# Patient Record
Sex: MALE | Race: BLACK OR AFRICAN AMERICAN | NOT HISPANIC OR LATINO | Employment: OTHER | RURAL
[De-identification: names, ages, dates, MRNs, and addresses within clinical notes are randomized per-mention and may not be internally consistent; named-entity substitution may affect disease eponyms.]

---

## 2021-08-24 ENCOUNTER — OFFICE VISIT (OUTPATIENT)
Dept: FAMILY MEDICINE CLINIC | Facility: CLINIC | Age: 66
End: 2021-08-24

## 2021-08-24 ENCOUNTER — LAB (OUTPATIENT)
Dept: LAB | Facility: HOSPITAL | Age: 66
End: 2021-08-24

## 2021-08-24 VITALS
DIASTOLIC BLOOD PRESSURE: 90 MMHG | SYSTOLIC BLOOD PRESSURE: 170 MMHG | HEIGHT: 68 IN | OXYGEN SATURATION: 95 % | TEMPERATURE: 97.5 F | WEIGHT: 298.3 LBS | BODY MASS INDEX: 45.21 KG/M2 | HEART RATE: 102 BPM

## 2021-08-24 DIAGNOSIS — M54.2 NECK PAIN ON LEFT SIDE: ICD-10-CM

## 2021-08-24 DIAGNOSIS — Z79.4 TYPE 2 DIABETES MELLITUS WITH HYPERGLYCEMIA, WITH LONG-TERM CURRENT USE OF INSULIN (HCC): ICD-10-CM

## 2021-08-24 DIAGNOSIS — R13.10 DYSPHAGIA, UNSPECIFIED TYPE: ICD-10-CM

## 2021-08-24 DIAGNOSIS — Z13.220 LIPID SCREENING: ICD-10-CM

## 2021-08-24 DIAGNOSIS — Z11.59 NEED FOR HEPATITIS C SCREENING TEST: ICD-10-CM

## 2021-08-24 DIAGNOSIS — E11.65 TYPE 2 DIABETES MELLITUS WITH HYPERGLYCEMIA, WITH LONG-TERM CURRENT USE OF INSULIN (HCC): ICD-10-CM

## 2021-08-24 DIAGNOSIS — I10 HTN (HYPERTENSION), BENIGN: ICD-10-CM

## 2021-08-24 LAB
ALBUMIN SERPL-MCNC: 4.3 G/DL (ref 3.5–5.2)
ALBUMIN UR-MCNC: 65.2 MG/DL
ALBUMIN/GLOB SERPL: 1.1 G/DL
ALP SERPL-CCNC: 63 U/L (ref 39–117)
ALT SERPL W P-5'-P-CCNC: 22 U/L (ref 1–41)
ANION GAP SERPL CALCULATED.3IONS-SCNC: 11 MMOL/L (ref 5–15)
AST SERPL-CCNC: 22 U/L (ref 1–40)
BASOPHILS # BLD AUTO: 0.05 10*3/MM3 (ref 0–0.2)
BASOPHILS NFR BLD AUTO: 0.6 % (ref 0–1.5)
BILIRUB SERPL-MCNC: 0.6 MG/DL (ref 0–1.2)
BUN SERPL-MCNC: 16 MG/DL (ref 8–23)
BUN/CREAT SERPL: 12.3 (ref 7–25)
CALCIUM SPEC-SCNC: 9.7 MG/DL (ref 8.6–10.5)
CHLORIDE SERPL-SCNC: 96 MMOL/L (ref 98–107)
CHOLEST SERPL-MCNC: 210 MG/DL (ref 0–200)
CO2 SERPL-SCNC: 35 MMOL/L (ref 22–29)
CREAT SERPL-MCNC: 1.3 MG/DL (ref 0.76–1.27)
DEPRECATED RDW RBC AUTO: 41.6 FL (ref 37–54)
EOSINOPHIL # BLD AUTO: 0.08 10*3/MM3 (ref 0–0.4)
EOSINOPHIL NFR BLD AUTO: 1 % (ref 0.3–6.2)
ERYTHROCYTE [DISTWIDTH] IN BLOOD BY AUTOMATED COUNT: 16.8 % (ref 12.3–15.4)
EXPIRATION DATE: ABNORMAL
GFR SERPL CREATININE-BSD FRML MDRD: 67 ML/MIN/1.73
GLOBULIN UR ELPH-MCNC: 3.9 GM/DL
GLUCOSE SERPL-MCNC: 159 MG/DL (ref 65–99)
HBA1C MFR BLD: 9.9 %
HCT VFR BLD AUTO: 52 % (ref 37.5–51)
HDLC SERPL-MCNC: 41 MG/DL (ref 40–60)
HGB BLD-MCNC: 16.6 G/DL (ref 13–17.7)
IMM GRANULOCYTES # BLD AUTO: 0.02 10*3/MM3 (ref 0–0.05)
IMM GRANULOCYTES NFR BLD AUTO: 0.2 % (ref 0–0.5)
LDLC SERPL CALC-MCNC: 136 MG/DL (ref 0–100)
LDLC/HDLC SERPL: 3.24 {RATIO}
LYMPHOCYTES # BLD AUTO: 2.73 10*3/MM3 (ref 0.7–3.1)
LYMPHOCYTES NFR BLD AUTO: 33.5 % (ref 19.6–45.3)
Lab: ABNORMAL
MCH RBC QN AUTO: 25.3 PG (ref 26.6–33)
MCHC RBC AUTO-ENTMCNC: 31.9 G/DL (ref 31.5–35.7)
MCV RBC AUTO: 79.3 FL (ref 79–97)
MONOCYTES # BLD AUTO: 0.72 10*3/MM3 (ref 0.1–0.9)
MONOCYTES NFR BLD AUTO: 8.8 % (ref 5–12)
NEUTROPHILS NFR BLD AUTO: 4.55 10*3/MM3 (ref 1.7–7)
NEUTROPHILS NFR BLD AUTO: 55.9 % (ref 42.7–76)
NRBC BLD AUTO-RTO: 0.1 /100 WBC (ref 0–0.2)
PLATELET # BLD AUTO: 297 10*3/MM3 (ref 140–450)
PMV BLD AUTO: 10.8 FL (ref 6–12)
POTASSIUM SERPL-SCNC: 2.9 MMOL/L (ref 3.5–5.2)
PROT SERPL-MCNC: 8.2 G/DL (ref 6–8.5)
RBC # BLD AUTO: 6.56 10*6/MM3 (ref 4.14–5.8)
SODIUM SERPL-SCNC: 142 MMOL/L (ref 136–145)
TRIGL SERPL-MCNC: 181 MG/DL (ref 0–150)
TSH SERPL DL<=0.05 MIU/L-ACNC: 0.92 UIU/ML (ref 0.27–4.2)
VLDLC SERPL-MCNC: 33 MG/DL (ref 5–40)
WBC # BLD AUTO: 8.15 10*3/MM3 (ref 3.4–10.8)

## 2021-08-24 PROCEDURE — 83036 HEMOGLOBIN GLYCOSYLATED A1C: CPT | Performed by: STUDENT IN AN ORGANIZED HEALTH CARE EDUCATION/TRAINING PROGRAM

## 2021-08-24 PROCEDURE — 82043 UR ALBUMIN QUANTITATIVE: CPT | Performed by: STUDENT IN AN ORGANIZED HEALTH CARE EDUCATION/TRAINING PROGRAM

## 2021-08-24 PROCEDURE — 80053 COMPREHEN METABOLIC PANEL: CPT | Performed by: STUDENT IN AN ORGANIZED HEALTH CARE EDUCATION/TRAINING PROGRAM

## 2021-08-24 PROCEDURE — 80061 LIPID PANEL: CPT | Performed by: STUDENT IN AN ORGANIZED HEALTH CARE EDUCATION/TRAINING PROGRAM

## 2021-08-24 PROCEDURE — 99204 OFFICE O/P NEW MOD 45 MIN: CPT | Performed by: STUDENT IN AN ORGANIZED HEALTH CARE EDUCATION/TRAINING PROGRAM

## 2021-08-24 PROCEDURE — 84443 ASSAY THYROID STIM HORMONE: CPT | Performed by: STUDENT IN AN ORGANIZED HEALTH CARE EDUCATION/TRAINING PROGRAM

## 2021-08-24 PROCEDURE — 86803 HEPATITIS C AB TEST: CPT | Performed by: STUDENT IN AN ORGANIZED HEALTH CARE EDUCATION/TRAINING PROGRAM

## 2021-08-24 PROCEDURE — 85025 COMPLETE CBC W/AUTO DIFF WBC: CPT | Performed by: STUDENT IN AN ORGANIZED HEALTH CARE EDUCATION/TRAINING PROGRAM

## 2021-08-24 RX ORDER — CARVEDILOL 25 MG/1
25 TABLET ORAL 2 TIMES DAILY WITH MEALS
COMMUNITY
End: 2021-12-07 | Stop reason: SDUPTHER

## 2021-08-24 RX ORDER — POTASSIUM CHLORIDE 1500 MG/1
20 TABLET, FILM COATED, EXTENDED RELEASE ORAL DAILY
COMMUNITY
End: 2021-09-17 | Stop reason: SDUPTHER

## 2021-08-24 RX ORDER — MELATONIN
1250 DAILY
COMMUNITY
End: 2021-12-27 | Stop reason: SDUPTHER

## 2021-08-24 RX ORDER — HYDRALAZINE HYDROCHLORIDE 50 MG/1
50 TABLET, FILM COATED ORAL 3 TIMES DAILY
COMMUNITY
End: 2021-12-07 | Stop reason: SDUPTHER

## 2021-08-24 RX ORDER — CHLORTHALIDONE 50 MG/1
50 TABLET ORAL DAILY
COMMUNITY
End: 2021-12-27 | Stop reason: SDUPTHER

## 2021-08-24 RX ORDER — NIFEDIPINE 60 MG/1
60 TABLET, EXTENDED RELEASE ORAL 2 TIMES DAILY
COMMUNITY
End: 2021-12-27 | Stop reason: SDUPTHER

## 2021-08-24 RX ORDER — SPIRONOLACTONE 100 MG/1
100 TABLET, FILM COATED ORAL DAILY
COMMUNITY
End: 2021-12-07 | Stop reason: SDUPTHER

## 2021-08-24 RX ORDER — CHLORAL HYDRATE 500 MG
1000 CAPSULE ORAL 2 TIMES DAILY WITH MEALS
COMMUNITY
End: 2021-12-27 | Stop reason: SDUPTHER

## 2021-08-24 RX ORDER — GABAPENTIN 800 MG/1
800 TABLET ORAL 3 TIMES DAILY
COMMUNITY
End: 2021-10-26 | Stop reason: SDUPTHER

## 2021-08-24 RX ORDER — MELOXICAM 15 MG/1
15 TABLET ORAL DAILY
COMMUNITY
End: 2021-12-07

## 2021-08-24 RX ORDER — OMEPRAZOLE 20 MG/1
20 CAPSULE, DELAYED RELEASE ORAL 2 TIMES DAILY
COMMUNITY
End: 2021-09-09 | Stop reason: DRUGHIGH

## 2021-08-24 RX ORDER — BLOOD-GLUCOSE METER
KIT MISCELLANEOUS
Qty: 1 EACH | Refills: 0 | Status: SHIPPED | OUTPATIENT
Start: 2021-08-24

## 2021-08-24 RX ORDER — CHOLECALCIFEROL (VITAMIN D3) 125 MCG
500 CAPSULE ORAL DAILY
COMMUNITY
End: 2021-12-27 | Stop reason: SDUPTHER

## 2021-08-24 RX ORDER — INSULIN GLARGINE 100 [IU]/ML
60 INJECTION, SOLUTION SUBCUTANEOUS
COMMUNITY
End: 2021-12-27 | Stop reason: SDUPTHER

## 2021-08-24 RX ORDER — LANCETS 28 GAUGE
EACH MISCELLANEOUS
Qty: 100 EACH | Refills: 5 | Status: SHIPPED | OUTPATIENT
Start: 2021-08-24 | End: 2022-01-05 | Stop reason: SDUPTHER

## 2021-08-24 RX ORDER — ROSUVASTATIN CALCIUM 40 MG/1
20 TABLET, COATED ORAL 3 TIMES WEEKLY
COMMUNITY
End: 2021-12-07 | Stop reason: SDUPTHER

## 2021-08-24 NOTE — PROGRESS NOTES
"Subjective:  Azael Bray is a 65 y.o. male who presents for establish care.    Previous PCP of 20 years retired, has not seen a MD in ~ 6 months.     Hypertension; patient currently on chlorthalidone 50 mg daily, carvedilol 25 mg, Hydralazine 50mg TID, Nifedipine 60mg XL. Does not remember if he has been on lisinopril or losartan. Blood pressure at home is ~150's/80's. No headaches, vision changes, numbness, tingling, weakness. States has not been taking his medication regularly because of dysphagia. Reports a hyperactive adrenal gland, reportedly had urine testing and CT scan with contrast in 2012 and was told that it did not have to be removed. Has sleep apnea but not compliant with CPAP.     DM2; States is on Lantus 62u every morning, Trulicity 1.5mg weekly. States was on Metformin but was unable to tolerate. Believes previous a1c was 7.2%, A1c was 9.9%. Up to date on eye exam and foot exam.        Dysphagia; states that has left sided neck discomfort and dysphagia. States had a EGD which was reportedly negative. Does not cough after drinking water or foods.  States approximately 4 months ago was 330 pounds, is down to 300 pounds, weight loss intentional.  No night sweats, fever, chills, lymphadenopathy.    Vitals:    Vitals:    08/24/21 0829   BP: 170/90   BP Location: Right arm   Patient Position: Sitting   Cuff Size: Large Adult   Pulse: 102   Temp: 97.5 °F (36.4 °C)   SpO2: 95%   Weight: 135 kg (298 lb 4.8 oz)   Height: 172.7 cm (68\")     Body mass index is 45.36 kg/m².    Current Outpatient Medications:   •  carvedilol (COREG) 25 MG tablet, Take 25 mg by mouth 2 (Two) Times a Day With Meals., Disp: , Rfl:   •  chlorthalidone (HYGROTEN) 50 MG tablet, Take 50 mg by mouth Daily., Disp: , Rfl:   •  cholecalciferol (VITAMIN D3) 25 MCG (1000 UT) tablet, Take 1,250 Units by mouth Daily., Disp: , Rfl:   •  gabapentin (NEURONTIN) 800 MG tablet, Take 800 mg by mouth 3 (Three) Times a Day., Disp: , Rfl:   •  " hydrALAZINE (APRESOLINE) 50 MG tablet, Take 50 mg by mouth 3 (Three) Times a Day., Disp: , Rfl:   •  insulin glargine (Lantus) 100 UNIT/ML injection, Inject 60 Units under the skin into the appropriate area as directed Every Morning Before Breakfast., Disp: , Rfl:   •  Insulin Syringes, Disposable, U-100 0.3 ML misc, 1 each Daily., Disp: , Rfl:   •  meloxicam (MOBIC) 15 MG tablet, Take 15 mg by mouth Daily., Disp: , Rfl:   •  NIFEdipine XL (PROCARDIA XL) 60 MG 24 hr tablet, Take 60 mg by mouth 2 (two) times a day., Disp: , Rfl:   •  Omega-3 Fatty Acids (fish oil) 1000 MG capsule capsule, Take 1,000 mg by mouth 2 (Two) Times a Day With Meals., Disp: , Rfl:   •  omeprazole (priLOSEC) 20 MG capsule, Take 20 mg by mouth 2 (two) times a day., Disp: , Rfl:   •  potassium chloride ER (K-TAB) 20 MEQ tablet controlled-release ER tablet, Take 20 mEq by mouth Daily., Disp: , Rfl:   •  rosuvastatin (CRESTOR) 40 MG tablet, Take 20 mg by mouth 3 (Three) Times a Week., Disp: , Rfl:   •  spironolactone (ALDACTONE) 100 MG tablet, Take 50 mg by mouth Daily., Disp: , Rfl:   •  vitamin B-12 (CYANOCOBALAMIN) 500 MCG tablet, Take 500 mcg by mouth Daily., Disp: , Rfl:   •  Dulaglutide 3 MG/0.5ML solution pen-injector, Inject 3 mg under the skin into the appropriate area as directed 1 (One) Time Per Week., Disp: 4 pen, Rfl: 3  •  glucose blood test strip, Use daily and as need up to TID, Disp: 100 each, Rfl: 5  •  glucose monitor monitoring kit, Use daily and as need up to TID, Disp: 1 each, Rfl: 0  •  Lancets (freestyle) lancets, Use daily and as need up to TID, Disp: 100 each, Rfl: 5    There is no problem list on file for this patient.    Past Surgical History:   Procedure Laterality Date   • APPENDECTOMY     • HERNIA REPAIR Left      Social History     Socioeconomic History   • Marital status:      Spouse name: Not on file   • Number of children: Not on file   • Years of education: Not on file   • Highest education level: Not on  file   Tobacco Use   • Smoking status: Former Smoker   • Smokeless tobacco: Never Used   Vaping Use   • Vaping Use: Never used   Substance and Sexual Activity   • Alcohol use: Not Currently   • Drug use: Never   • Sexual activity: Not Currently     Family History   Problem Relation Age of Onset   • Diabetes Father    • Hypertension Father    • Heart disease Father    • Diabetes Paternal Grandfather      No visits with results within 6 Month(s) from this visit.   Latest known visit with results is:   No results found for any previous visit.      No image results found.      [unfilled]  Immunization History   Administered Date(s) Administered   • COVID-19 (MODERNA) 02/24/2021, 03/24/2021     The following portions of the patient's history were reviewed and updated as appropriate: allergies, current medications, past family history, past medical history, past social history, past surgical history and problem list.    PHQ-9 Total Score: 13       Lab 08/24/21  0914   HEMOGLOBIN A1C 9.9*       Physical Exam  Constitutional:       General: He is not in acute distress.  HENT:      Head: Normocephalic and atraumatic.      Right Ear: Tympanic membrane and ear canal normal.      Left Ear: Tympanic membrane and ear canal normal.      Mouth/Throat:      Mouth: Mucous membranes are moist.      Pharynx: Oropharynx is clear. No posterior oropharyngeal erythema.   Eyes:      Extraocular Movements: Extraocular movements intact.      Pupils: Pupils are equal, round, and reactive to light.   Neck:     Cardiovascular:      Rate and Rhythm: Normal rate and regular rhythm.      Heart sounds: Normal heart sounds. No murmur heard.     Pulmonary:      Effort: Pulmonary effort is normal.      Breath sounds: Normal breath sounds.   Abdominal:      General: Bowel sounds are normal.      Palpations: Abdomen is soft.      Tenderness: There is no abdominal tenderness.   Musculoskeletal:         General: No swelling.      Right lower leg: No  edema.      Left lower leg: No edema.   Skin:     Coloration: Skin is not jaundiced.      Findings: No rash.   Neurological:      Mental Status: He is alert and oriented to person, place, and time. Mental status is at baseline.   Psychiatric:         Mood and Affect: Mood normal.         Behavior: Behavior normal.       Assessment/Plan    Diagnosis Plan   1. Type 2 diabetes mellitus with hyperglycemia, with long-term current use of insulin (CMS/[facility])  POC Glycated Hemoglobin, Total    Lancets (freestyle) lancets    glucose monitor monitoring kit    glucose blood test strip    CBC & Differential    Comprehensive Metabolic Panel    Dulaglutide 3 MG/0.5ML solution pen-injector    MicroAlbumin, Urine, Random - Urine, Clean Catch   2. Neck pain on left side  US Thyroid    TSH Rfx On Abnormal To Free T4   3. Dysphagia, unspecified type  Ambulatory Referral to Gastroenterology    TSH Rfx On Abnormal To Free T4   4. HTN (hypertension), benign     5. Lipid screening  Lipid Panel   6. Need for hepatitis C screening test  HCV Antibody Rfx To Qnt PCR      Orders Placed This Encounter   Procedures   • US Thyroid     Order Specific Question:   Reason for Exam:     Answer:   left sided neck pain with swallowing.     Order Specific Question:   Release to patient     Answer:   Immediate   • Comprehensive Metabolic Panel     Order Specific Question:   Release to patient     Answer:   Immediate   • Lipid Panel   • HCV Antibody Rfx To Qnt PCR     Order Specific Question:   Release to patient     Answer:   Immediate   • TSH Rfx On Abnormal To Free T4     Order Specific Question:   Release to patient     Answer:   Immediate   • MicroAlbumin, Urine, Random - Urine, Clean Catch     Order Specific Question:   Release to patient     Answer:   Immediate   • Ambulatory Referral to Gastroenterology     Referral Priority:   Routine     Referral Type:   Consultation     Referral Reason:   Specialty Services Required     Requested Specialty:    Gastroenterology     Number of Visits Requested:   1   • POC Glycated Hemoglobin, Total     Order Specific Question:   Release to patient     Answer:   Immediate   • CBC & Differential     Order Specific Question:   Manual Differential     Answer:   No     Hypertension; uncontrolled, likely due to medication nonadherence, CPAP nonadherence, adrenal tumor?  Reports adrenal tumor work-up in 2012 - for pheochromocytoma, need for intervention, denied palpitations, tachycardia, sweating, headaches, vision changes.  Reassuring.  Will obtain prior records, advised on importance of medication adherence, CPAP adherence, patient voiced understanding.  Will bring blood pressure log to follow-up in 3 weeks.    Dysphagia/neck pain; patient reports neck pain located over left side with swallowing, as such, has not been taking medications.  Physical exam reassuring, will obtain ultrasound and refer to gastroenterology for dysphagia work-up.  Denied history of prior surgeries.    Diabetes; uncontrolled, will increase Trulicity to 3 mg weekly, to facilitate better blood glucose control, weight loss.  Patient agreeable to plan.  Follow-up in 3 weeks with blood glucose log.  Up-to-date on eye, foot exam.  Labs as above, urine microalbumin as above, consider lisinopril/losartan.            This document has been electronically signed by Rafat Lin MD on August 24, 2021 09:44 CDT

## 2021-08-25 LAB
HCV AB S/CO SERPL IA: 0.4 S/CO RATIO (ref 0–0.9)
HCV AB SERPL QL IA: NORMAL

## 2021-08-25 NOTE — PROGRESS NOTES
Negative for hepatitis C, thyroid disease, liver disease.  Labs significant for hypokalemia, as such, please double your current potassium supplementation.  Additionally, cholesterol is uncontrolled, may need to titrate your cholesterol medication in future.  No sign of infection or anemia.

## 2021-08-26 ENCOUNTER — OFFICE VISIT (OUTPATIENT)
Dept: GASTROENTEROLOGY | Facility: CLINIC | Age: 66
End: 2021-08-26

## 2021-08-26 VITALS
HEART RATE: 82 BPM | SYSTOLIC BLOOD PRESSURE: 182 MMHG | DIASTOLIC BLOOD PRESSURE: 91 MMHG | WEIGHT: 296 LBS | BODY MASS INDEX: 44.86 KG/M2 | HEIGHT: 68 IN

## 2021-08-26 DIAGNOSIS — R13.10 DYSPHAGIA, UNSPECIFIED TYPE: Primary | ICD-10-CM

## 2021-08-26 DIAGNOSIS — R11.0 NAUSEA: ICD-10-CM

## 2021-08-26 DIAGNOSIS — K21.00 GASTROESOPHAGEAL REFLUX DISEASE WITH ESOPHAGITIS WITHOUT HEMORRHAGE: ICD-10-CM

## 2021-08-26 PROCEDURE — 99203 OFFICE O/P NEW LOW 30 MIN: CPT | Performed by: PHYSICIAN ASSISTANT

## 2021-08-27 ENCOUNTER — TELEPHONE (OUTPATIENT)
Dept: FAMILY MEDICINE CLINIC | Facility: CLINIC | Age: 66
End: 2021-08-27

## 2021-09-02 NOTE — TELEPHONE ENCOUNTER
Trulicity PA was denied because it was sent through Medicare part A and B. Called [facility] pharmacy to see if this needed a PA through his part D insurance. I was told that this still will need a PA. Requested Part D info.    [facility]  [id number]  Bin  [id number]  ID [id number]    Resubmitted to plan

## 2021-09-03 ENCOUNTER — PATIENT ROUNDING (BHMG ONLY) (OUTPATIENT)
Dept: FAMILY MEDICINE CLINIC | Facility: CLINIC | Age: 66
End: 2021-09-03

## 2021-09-03 NOTE — TELEPHONE ENCOUNTER
This was denied again stating that it cannot be billed under part B. I made sure to put it in for part D. I called [facility] and asked to speak with someone about doing this PA over the phone so I could make sure it is being sent through part D. Unfortunatley, when I called, they said that their system was down and that I would have to call back later. I was given the number [phone] for the PA line for [employer] part D.

## 2021-09-03 NOTE — PROGRESS NOTES
September 3, 2021        My name is Steven Faith LPN     I am  with [facility] - [address] [place] [address] [phone].      I am calling to officially welcome you to our practice and ask about your recent visit. Is this a good time to talk? No-Voicemail left      Thank you, and have a great day.

## 2021-09-09 ENCOUNTER — OFFICE VISIT (OUTPATIENT)
Dept: GASTROENTEROLOGY | Facility: CLINIC | Age: 66
End: 2021-09-09

## 2021-09-09 VITALS
DIASTOLIC BLOOD PRESSURE: 73 MMHG | WEIGHT: 302.2 LBS | SYSTOLIC BLOOD PRESSURE: 156 MMHG | HEIGHT: 68 IN | BODY MASS INDEX: 45.8 KG/M2 | HEART RATE: 86 BPM

## 2021-09-09 DIAGNOSIS — K21.00 GASTROESOPHAGEAL REFLUX DISEASE WITH ESOPHAGITIS WITHOUT HEMORRHAGE: ICD-10-CM

## 2021-09-09 DIAGNOSIS — R13.10 DYSPHAGIA, UNSPECIFIED TYPE: Primary | ICD-10-CM

## 2021-09-09 DIAGNOSIS — R11.0 NAUSEA: ICD-10-CM

## 2021-09-09 PROCEDURE — 99213 OFFICE O/P EST LOW 20 MIN: CPT | Performed by: PHYSICIAN ASSISTANT

## 2021-09-09 RX ORDER — OMEPRAZOLE 40 MG/1
40 CAPSULE, DELAYED RELEASE ORAL 2 TIMES DAILY
Qty: 60 CAPSULE | Refills: 2 | Status: SHIPPED | OUTPATIENT
Start: 2021-09-09 | End: 2021-12-07 | Stop reason: SDUPTHER

## 2021-09-09 NOTE — PATIENT INSTRUCTIONS
MyPlate from USDA    MyPlate is an outline of a general healthy diet based on the 2010 Dietary Guidelines for Americans, from the U.S. Department of Agriculture (USDA). It sets guidelines for how much food you should eat from each food group based on your age, sex, and level of physical activity.  What are tips for following MyPlate?  To follow MyPlate recommendations:  · Eat a wide variety of fruits and vegetables, grains, and protein foods.  · Serve smaller portions and eat less food throughout the day.  · Limit portion sizes to avoid overeating.  · Enjoy your food.  · Get at least 150 minutes of exercise every week. This is about 30 minutes each day, 5 or more days per week.  It can be difficult to have every meal look like MyPlate. Think about MyPlate as eating guidelines for an entire day, rather than each individual meal.  Fruits and vegetables  · Make half of your plate fruits and vegetables.  · Eat many different colors of fruits and vegetables each day.  · For a 2,000 calorie daily food plan, eat:  ? 2½ cups of vegetables every day.  ? 2 cups of fruit every day.  · 1 cup is equal to:  ? 1 cup raw or cooked vegetables.  ? 1 cup raw fruit.  ? 1 medium-sized orange, apple, or banana.  ? 1 cup 100% fruit or vegetable juice.  ? 2 cups raw leafy greens, such as lettuce, spinach, or kale.  ? ½ cup dried fruit.  Grains  · One fourth of your plate should be grains.  · Make at least half of the grains you eat each day whole grains.  · For a 2,000 calorie daily food plan, eat 6 oz of grains every day.  · 1 oz is equal to:  ? 1 slice bread.  ? 1 cup cereal.  ? ½ cup cooked rice, cereal, or pasta.  Protein  · One fourth of your plate should be protein.  · Eat a wide variety of protein foods, including meat, poultry, fish, eggs, beans, nuts, and tofu.  · For a 2,000 calorie daily food plan, eat 5½ oz of protein every day.  · 1 oz is equal to:  ? 1 oz meat, poultry, or fish.  ? ¼ cup cooked beans.  ? 1 egg.  ? ½ oz nuts  or seeds.  ? 1 Tbsp peanut butter.  Dairy  · Drink fat-free or low-fat (1%) milk.  · Eat or drink dairy as a side to meals.  · For a 2,000 calorie daily food plan, eat or drink 3 cups of dairy every day.  · 1 cup is equal to:  ? 1 cup milk, yogurt, cottage cheese, or soy milk (soy beverage).  ? 2 oz processed cheese.  ? 1½ oz natural cheese.  Fats, oils, salt, and sugars  · Only small amounts of oils are recommended.  · Avoid foods that are high in calories and low in nutritional value (empty calories), like foods high in fat or added sugars.  · Choose foods that are low in salt (sodium). Choose foods that have less than 140 milligrams (mg) of sodium per serving.  · Drink water instead of sugary drinks. Drink enough water each day to keep your urine pale yellow.  Where to find support  · Work with your health care provider or a nutrition specialist (dietitian) to develop a customized eating plan that is right for you.  · Download an yuniel (mobile application) to help you track your daily food intake.  Where to find more information  · Go to ChooseMyPlate.gov for more information.  Summary  · MyPlate is a general guideline for healthy eating from the USDA. It is based on the 2010 Dietary Guidelines for Americans.  · In general, fruits and vegetables should take up ½ of your plate, grains should take up ¼ of your plate, and protein should take up ¼ of your plate.  This information is not intended to replace advice given to you by your health care provider. Make sure you discuss any questions you have with your health care provider.  Document Revised: 05/21/2020 Document Reviewed: 03/19/2018  Elsevier Patient Education © 2021 Elsevier Inc.  BMI for Adults  What is BMI?  Body mass index (BMI) is a number that is calculated from a person's weight and height. BMI can help estimate how much of a person's weight is composed of fat. BMI does not measure body fat directly. Rather, it is an alternative to procedures that  "directly measure body fat, which can be difficult and expensive.  BMI can help identify people who may be at higher risk for certain medical problems.  What are BMI measurements used for?  BMI is used as a screening tool to identify possible weight problems. It helps determine whether a person is obese, overweight, a healthy weight, or underweight.  BMI is useful for:  · Identifying a weight problem that may be related to a medical condition or may increase the risk for medical problems.  · Promoting changes, such as changes in diet and exercise, to help reach a healthy weight. BMI screening can be repeated to see if these changes are working.  How is BMI calculated?  BMI involves measuring your weight in relation to your height. Both height and weight are measured, and the BMI is calculated from those numbers. This can be done either in English (U[place] or metric measurements. Note that charts and online BMI calculators are available to help you find your BMI quickly and easily without having to do these calculations yourself.  To calculate your BMI in English (U.S[place] measurements:    1. Measure your weight in pounds (lb).  2. Multiply the number of pounds by 703.  ? For example, for a person who weighs 180 lb, multiply that number by 703, which equals 126,540.  3. Measure your height in inches. Then multiply that number by itself to get a measurement called \"inches squared.\"  ? For example, for a person who is 70 inches tall, the \"inches squared\" measurement is 70 inches x 70 inches, which equals 4,900 inches squared.  4. Divide the total from step 2 (number of lb x 703) by the total from step 3 (inches squared): 126,540 ÷ 4,900 = 25.8. This is your BMI.    To calculate your BMI in metric measurements:  1. Measure your weight in kilograms (kg).  2. Measure your height in meters (m). Then multiply that number by itself to get a measurement called \"meters squared.\"  ? For example, for a person who is 1.75 m tall, the " "\"meters squared\" measurement is 1.75 m x 1.75 m, which is equal to 3.1 meters squared.  3. Divide the number of kilograms (your weight) by the meters squared number. In this example: 70 ÷ 3.1 = 22.6. This is your BMI.  What do the results mean?  BMI charts are used to identify whether you are underweight, normal weight, overweight, or obese. The following guidelines will be used:  · Underweight: BMI less than 18.5.  · Normal weight: BMI between 18.5 and 24.9.  · Overweight: BMI between 25 and 29.9.  · Obese: BMI of 30 or above.  Keep these notes in mind:  · Weight includes both fat and muscle, so someone with a muscular build, such as an athlete, may have a BMI that is higher than 24.9. In cases like these, BMI is not an accurate measure of body fat.  · To determine if excess body fat is the cause of a BMI of 25 or higher, further assessments may need to be done by a health care provider.  · BMI is usually interpreted in the same way for men and women.  Where to find more information  For more information about BMI, including tools to quickly calculate your BMI, go to these websites:  · Centers for Disease Control and Prevention: www.cdc.gov  · American Heart Association: www.heart.org  · National Heart, Lung, and Blood [place]: www.nhlbi.nih.gov  Summary  · Body mass index (BMI) is a number that is calculated from a person's weight and height.  · BMI may help estimate how much of a person's weight is composed of fat. BMI can help identify those who may be at higher risk for certain medical problems.  · BMI can be measured using English measurements or metric measurements.  · BMI charts are used to identify whether you are underweight, normal weight, overweight, or obese.  This information is not intended to replace advice given to you by your health care provider. Make sure you discuss any questions you have with your health care provider.  Document Revised: 09/09/2020 Document Reviewed: 07/17/2020  Sudheer " Patient Education © 2021 Elsevier Inc.

## 2021-09-09 NOTE — PROGRESS NOTES
Chief Complaint   Patient presents with   • Difficulty Swallowing   • Nausea       ENDO PROCEDURE ORDERED:    Subjective    Azael Bray is a 65 y.o. male. he is here today for follow-up.    History of Present Illness    The patient is seen on recheck of his nausea, GERD, dysphagia, and globus.  Last seen on 08/26/2021.  The patient had an esophagram at [facility] on 09/07/2021 and showed normal cervical esophagus and small hiatal hernia.  [facility]  He did not bring copies of the images of his esophagram as I requested, nor did he bring copies of any of his previous workup.  Again, he is still describing a sensation of things kind of hanging in the left of his neck.  He is on Prilosec 20 mg BID.  Bowels are moving without blood or mucus.  Weight is up 6 pounds since the last visit.  He believes he [facility] [place].      ASSESSMENT AND PLAN:  I had initially discussed possibly doing esophageal motility with the patient.  He described having had that before.  He has also apparently been worked up with ENT.  Unfortunately, again, I told him I have none of these records from his previous workup and have asked to try to get those.  If he is able, I have asked him to bring a copy of those records.  We will try increasing his Prilosec to 40 mg BID.  He states he is scheduled to have a thyroid ultrasound.  We will plan follow-up in 1 month.  Further will depend on clinical course and results of the above.       The following portions of the patient's history were reviewed and updated as appropriate:   Past Medical History:   Diagnosis Date   • Asthma    • Carpal tunnel syndrome    • CHF (congestive heart failure) (CMS/HCC)    • Diabetes mellitus (CMS/[facility])    • Hypertension    • PTSD (post-traumatic stress disorder)    • Sleep apnea      Past Surgical History:   Procedure Laterality Date   • APPENDECTOMY     • HERNIA REPAIR Left       Family History   Problem Relation Age of Onset   • Diabetes Father    • Hypertension Father    • Heart disease Father    • Diabetes Paternal Grandfather        No Known Allergies  Social History     Socioeconomic History   • Marital status:      Spouse name: Not on file   • Number of children: Not on file   • Years of education: Not on file   • Highest education level: Not on file   Tobacco Use   • Smoking status: Former Smoker   • Smokeless tobacco: Never Used   Vaping Use   • Vaping Use: Never used   Substance and Sexual Activity   • Alcohol use: Not Currently   • Drug use: Never   • Sexual activity: Not Currently     Current Medications:  Prior to Admission medications    Medication Sig Start Date End Date Taking? Authorizing Provider   carvedilol (COREG) 25 MG tablet Take 25 mg by mouth 2 (Two) Times a Day With Meals.   Yes Lesly Weaver MD   chlorthalidone (HYGROTEN) 50 MG tablet Take 50 mg by mouth Daily.   Yes Lesly Weaver MD   cholecalciferol (VITAMIN D3) 25 MCG (1000 UT) tablet Take 1,250 Units by mouth Daily.   Yes Lesly Weaver MD   Dulaglutide 3 MG/0.5ML solution pen-injector Inject 3 mg under the skin into the appropriate area as directed 1 (One) Time Per Week. 8/24/21  Yes Rafat Lin MD   gabapentin (NEURONTIN) 800 MG tablet Take 800 mg by mouth 3 (Three) Times a Day.   Yes Lesly Weaver MD   glucose blood test strip Use daily and as need up to TID 8/24/21  Yes Rafat Lin MD   glucose monitor monitoring kit Use daily and as need up to TID 8/24/21  Yes Rafat Lin MD   hydrALAZINE (APRESOLINE) 50 MG tablet Take 50 mg by mouth 3 (Three) Times a Day.   Yes Lesly Weaver MD   insulin glargine (Lantus) 100 UNIT/ML injection Inject 60 Units under the skin into the appropriate area as directed Every Morning Before Breakfast.   Yes Lesly Weaver MD   Insulin Syringes, Disposable, U-100 0.3 ML misc 1 each Daily.   Yes Meg  "MD Lesly   Lancets (freestyle) lancets Use daily and as need up to TID 8/24/21  Yes Rafat Lin MD   meloxicam (MOBIC) 15 MG tablet Take 15 mg by mouth Daily.   Yes Lesly Weaver MD   NIFEdipine XL (PROCARDIA XL) 60 MG 24 hr tablet Take 60 mg by mouth 2 (two) times a day.   Yes Lesly Weaver MD   Omega-3 Fatty Acids (fish oil) 1000 MG capsule capsule Take 1,000 mg by mouth 2 (Two) Times a Day With Meals.   Yes Lesly Weaver MD   omeprazole (priLOSEC) 20 MG capsule Take 20 mg by mouth 2 (two) times a day.   Yes Lesly Weaver MD   potassium chloride ER (K-TAB) 20 MEQ tablet controlled-release ER tablet Take 20 mEq by mouth Daily.   Yes Provider, Historical, MD   rosuvastatin (CRESTOR) 40 MG tablet Take 20 mg by mouth 3 (Three) Times a Week.   Yes Lesly Weaver MD   spironolactone (ALDACTONE) 100 MG tablet Take 50 mg by mouth Daily.   Yes Lesly Weaver MD   vitamin B-12 (CYANOCOBALAMIN) 500 MCG tablet Take 500 mcg by mouth Daily.   Yes Lesly Weaver MD     Review of Systems  Review of Systems       Objective    /73   Pulse 86   Ht 172.7 cm (68\")   Wt (!) 137 kg (302 lb 3.2 oz)   BMI 45.95 kg/m²   Physical Exam  Vitals and nursing note reviewed.   Constitutional:       General: He is not in acute distress.     Appearance: He is well-developed.      Comments: FREDY   HENT:      Head: Normocephalic and atraumatic.   Eyes:      Pupils: Pupils are equal, round, and reactive to light.   Cardiovascular:      Rate and Rhythm: Normal rate and regular rhythm.      Heart sounds: Normal heart sounds.   Pulmonary:      Effort: Pulmonary effort is normal.      Breath sounds: Normal breath sounds.   Abdominal:      General: Bowel sounds are normal. There is no distension or abdominal bruit.      Palpations: Abdomen is soft. Abdomen is not rigid. There is no shifting dullness or mass.      Tenderness: There is no abdominal tenderness. There is no guarding or " rebound.      Hernia: No hernia is present. There is no hernia in the ventral area.   Musculoskeletal:         General: Normal range of motion.      Cervical back: Normal range of motion.   Skin:     General: Skin is warm and dry.   Neurological:      Mental Status: He is alert and oriented to person, place, and time.   Psychiatric:         Behavior: Behavior normal.         Thought Content: Thought content normal.         Judgment: Judgment normal.       Assessment/Plan      1. Dysphagia, unspecified type    2. Nausea    3. Gastroesophageal reflux disease with esophagitis without hemorrhage    .   Diagnoses and all orders for this visit:    1. Dysphagia, unspecified type (Primary)    2. Nausea    3. Gastroesophageal reflux disease with esophagitis without hemorrhage    Other orders  -     omeprazole (priLOSEC) 40 MG capsule; Take 1 capsule by mouth 2 (two) times a day.  Dispense: 60 capsule; Refill: 2        Orders placed during this encounter include:  No orders of the defined types were placed in this encounter.      Medications prescribed:  New Medications Ordered This Visit   Medications   • omeprazole (priLOSEC) 40 MG capsule     Sig: Take 1 capsule by mouth 2 (two) times a day.     Dispense:  60 capsule     Refill:  2       Requested Prescriptions     Signed Prescriptions Disp Refills   • omeprazole (priLOSEC) 40 MG capsule 60 capsule 2     Sig: Take 1 capsule by mouth 2 (two) times a day.       Review and/or summary of lab tests, radiology, procedures, medications. Review and summary of old records and obtaining of history. The risks and benefits of my recommendations, as well as other treatment options were discussed with the patient today. Questions were answered.    Follow-up: Return in about 1 month (around 10/9/2021), or if symptoms worsen or fail to improve.     * Surgery not found *      This document has been electronically signed by Kevin Rios PA-C on September 24, 2021 13:41 CDT      Results  for orders placed or performed in visit on 09/17/21   Urinalysis, Microscopic Only - Urine, Clean Catch    Specimen: Urine, Clean Catch   Result Value Ref Range    RBC, UA 0-2 None Seen, 0-2 /HPF    WBC, UA 0-2 None Seen, 0-2 /HPF    Bacteria, UA None Seen None Seen /HPF    Squamous Epithelial Cells, UA 0-2 None Seen, 0-2 /HPF    Hyaline Casts, UA None Seen None Seen /LPF    Methodology Automated Microscopy    Urinalysis With Culture If Indicated - Urine, Clean Catch    Specimen: Urine, Clean Catch   Result Value Ref Range    Color, UA Yellow Yellow, Straw    Appearance, UA Clear Clear    pH, UA 7.5 5.0 - 8.0    Specific Gravity, UA 1.012 1.005 - 1.030    Glucose, UA Negative Negative    Ketones, UA Negative Negative    Bilirubin, UA Negative Negative    Blood, UA Trace (A) Negative    Protein, UA 30 mg/dL (1+) (A) Negative    Leuk Esterase, UA Negative Negative    Nitrite, UA Negative Negative    Urobilinogen, UA 0.2 E.U./dL 0.2 - 1.0 E.U./dL   Basic metabolic panel    Specimen: Blood   Result Value Ref Range    Glucose 99 65 - 99 mg/dL    BUN 23 8 - 23 mg/dL    Creatinine 1.09 0.76 - 1.27 mg/dL    Sodium 141 136 - 145 mmol/L    Potassium 3.1 (L) 3.5 - 5.2 mmol/L    Chloride 97 (L) 98 - 107 mmol/L    CO2 31.5 (H) 22.0 - 29.0 mmol/L    Calcium 10.1 8.6 - 10.5 mg/dL    eGFR  African Amer 82 >60 mL/min/1.73    BUN/Creatinine Ratio 21.1 7.0 - 25.0    Anion Gap 12.5 5.0 - 15.0 mmol/L   Results for orders placed or performed in visit on 08/24/21   HCV Antibody Rfx To Qnt PCR    Specimen: Blood   Result Value Ref Range    Hepatitis C Ab 0.4 0.0 - 0.9 s/co ratio   Interpretation:    Specimen: Blood   Result Value Ref Range    Interpretation Comment    TSH Rfx On Abnormal To Free T4    Specimen: Blood   Result Value Ref Range    TSH 0.919 0.270 - 4.200 uIU/mL   CBC Auto Differential    Specimen: Blood   Result Value Ref Range    WBC 8.15 3.40 - 10.80 10*3/mm3    RBC 6.56 (H) 4.14 - 5.80 10*6/mm3    Hemoglobin 16.6 13.0 -  17.7 g/dL    Hematocrit 52.0 (H) 37.5 - 51.0 %    MCV 79.3 79.0 - 97.0 fL    MCH 25.3 (L) 26.6 - 33.0 pg    MCHC 31.9 31.5 - 35.7 g/dL    RDW 16.8 (H) 12.3 - 15.4 %    RDW-SD 41.6 37.0 - 54.0 fl    MPV 10.8 6.0 - 12.0 fL    Platelets 297 140 - 450 10*3/mm3    Neutrophil % 55.9 42.7 - 76.0 %    Lymphocyte % 33.5 19.6 - 45.3 %    Monocyte % 8.8 5.0 - 12.0 %    Eosinophil % 1.0 0.3 - 6.2 %    Basophil % 0.6 0.0 - 1.5 %    Immature Grans % 0.2 0.0 - 0.5 %    Neutrophils, Absolute 4.55 1.70 - 7.00 10*3/mm3    Lymphocytes, Absolute 2.73 0.70 - 3.10 10*3/mm3    Monocytes, Absolute 0.72 0.10 - 0.90 10*3/mm3    Eosinophils, Absolute 0.08 0.00 - 0.40 10*3/mm3    Basophils, Absolute 0.05 0.00 - 0.20 10*3/mm3    Immature Grans, Absolute 0.02 0.00 - 0.05 10*3/mm3    nRBC 0.1 0.0 - 0.2 /100 WBC   MicroAlbumin, Urine, Random - Urine, Clean Catch    Specimen: Urine, Clean Catch   Result Value Ref Range    Microalbumin, Urine 65.2 mg/dL   POC Glycated Hemoglobin, Total    Specimen: Urine   Result Value Ref Range    Hemoglobin A1C 9.9 (H) %    Lot Number [id number]     Expiration Date 72,023    Lipid Panel    Specimen: Blood   Result Value Ref Range    Total Cholesterol 210 (H) 0 - 200 mg/dL    Triglycerides 181 (H) 0 - 150 mg/dL    HDL Cholesterol 41 40 - 60 mg/dL    LDL Cholesterol  136 (H) 0 - 100 mg/dL    VLDL Cholesterol 33 5 - 40 mg/dL    LDL/HDL Ratio 3.24    Comprehensive Metabolic Panel    Specimen: Blood   Result Value Ref Range    Glucose 159 (H) 65 - 99 mg/dL    BUN 16 8 - 23 mg/dL    Creatinine 1.30 (H) 0.76 - 1.27 mg/dL    Sodium 142 136 - 145 mmol/L    Potassium 2.9 (L) 3.5 - 5.2 mmol/L    Chloride 96 (L) 98 - 107 mmol/L    CO2 35.0 (H) 22.0 - 29.0 mmol/L    Calcium 9.7 8.6 - 10.5 mg/dL    Total Protein 8.2 6.0 - 8.5 g/dL    Albumin 4.30 3.50 - 5.20 g/dL    ALT (SGPT) 22 1 - 41 U/L    AST (SGOT) 22 1 - 40 U/L    Alkaline Phosphatase 63 39 - 117 U/L    Total Bilirubin 0.6 0.0 - 1.2 mg/dL    eGFR  African Amer 67 >60  mL/min/1.73    Globulin 3.9 gm/dL    A/G Ratio 1.1 g/dL    BUN/Creatinine Ratio 12.3 7.0 - 25.0    Anion Gap 11.0 5.0 - 15.0 mmol/L

## 2021-09-10 ENCOUNTER — HOSPITAL ENCOUNTER (OUTPATIENT)
Dept: ULTRASOUND IMAGING | Facility: HOSPITAL | Age: 66
Discharge: HOME | End: 2021-09-10
Admitting: STUDENT IN AN ORGANIZED HEALTH CARE EDUCATION/TRAINING PROGRAM

## 2021-09-10 PROCEDURE — 76536 US EXAM OF HEAD AND NECK: CPT

## 2021-09-13 ENCOUNTER — TELEPHONE (OUTPATIENT)
Dept: FAMILY MEDICINE CLINIC | Facility: CLINIC | Age: 66
End: 2021-09-13

## 2021-09-13 NOTE — TELEPHONE ENCOUNTER
Called [facility] they transferred me to another number.     Spoke with Kyleigh. I was told that he doesn't have Part D coverage so it was denied. I tried to tell them that I have given them the part D info but she wouldn't listen. I will call the patient and see how he was previously getting the medication.

## 2021-09-16 ENCOUNTER — TELEPHONE (OUTPATIENT)
Dept: FAMILY MEDICINE CLINIC | Facility: CLINIC | Age: 66
End: 2021-09-16

## 2021-09-17 ENCOUNTER — LAB (OUTPATIENT)
Dept: LAB | Facility: HOSPITAL | Age: 66
End: 2021-09-17

## 2021-09-17 ENCOUNTER — OFFICE VISIT (OUTPATIENT)
Dept: FAMILY MEDICINE CLINIC | Facility: CLINIC | Age: 66
End: 2021-09-17

## 2021-09-17 VITALS
HEART RATE: 42 BPM | DIASTOLIC BLOOD PRESSURE: 96 MMHG | HEIGHT: 68 IN | BODY MASS INDEX: 45.91 KG/M2 | SYSTOLIC BLOOD PRESSURE: 160 MMHG | OXYGEN SATURATION: 98 % | TEMPERATURE: 97.5 F | WEIGHT: 302.9 LBS

## 2021-09-17 DIAGNOSIS — E27.8 MASS OF ADRENAL GLAND (HCC): ICD-10-CM

## 2021-09-17 DIAGNOSIS — N52.9 ERECTILE DYSFUNCTION, UNSPECIFIED ERECTILE DYSFUNCTION TYPE: ICD-10-CM

## 2021-09-17 DIAGNOSIS — E87.6 HYPOKALEMIA: Primary | ICD-10-CM

## 2021-09-17 PROCEDURE — 80048 BASIC METABOLIC PNL TOTAL CA: CPT | Performed by: STUDENT IN AN ORGANIZED HEALTH CARE EDUCATION/TRAINING PROGRAM

## 2021-09-17 PROCEDURE — 81001 URINALYSIS AUTO W/SCOPE: CPT | Performed by: STUDENT IN AN ORGANIZED HEALTH CARE EDUCATION/TRAINING PROGRAM

## 2021-09-17 PROCEDURE — 99214 OFFICE O/P EST MOD 30 MIN: CPT | Performed by: STUDENT IN AN ORGANIZED HEALTH CARE EDUCATION/TRAINING PROGRAM

## 2021-09-17 RX ORDER — SILDENAFIL 100 MG/1
100 TABLET, FILM COATED ORAL DAILY PRN
Qty: 15 TABLET | Refills: 5 | Status: SHIPPED | OUTPATIENT
Start: 2021-09-17 | End: 2021-12-07 | Stop reason: SDUPTHER

## 2021-09-17 RX ORDER — POTASSIUM CHLORIDE 1500 MG/1
20 TABLET, FILM COATED, EXTENDED RELEASE ORAL 2 TIMES DAILY
Qty: 120 TABLET | Refills: 1 | Status: SHIPPED | OUTPATIENT
Start: 2021-09-17 | End: 2021-09-24 | Stop reason: SDUPTHER

## 2021-09-18 LAB
ANION GAP SERPL CALCULATED.3IONS-SCNC: 12.5 MMOL/L (ref 5–15)
BACTERIA UR QL AUTO: NORMAL /HPF
BILIRUB UR QL STRIP: NEGATIVE
BUN SERPL-MCNC: 23 MG/DL (ref 8–23)
BUN/CREAT SERPL: 21.1 (ref 7–25)
CALCIUM SPEC-SCNC: 10.1 MG/DL (ref 8.6–10.5)
CHLORIDE SERPL-SCNC: 97 MMOL/L (ref 98–107)
CLARITY UR: CLEAR
CO2 SERPL-SCNC: 31.5 MMOL/L (ref 22–29)
COLOR UR: YELLOW
CREAT SERPL-MCNC: 1.09 MG/DL (ref 0.76–1.27)
GFR SERPL CREATININE-BSD FRML MDRD: 82 ML/MIN/1.73
GLUCOSE SERPL-MCNC: 99 MG/DL (ref 65–99)
GLUCOSE UR STRIP-MCNC: NEGATIVE MG/DL
HGB UR QL STRIP.AUTO: ABNORMAL
HYALINE CASTS UR QL AUTO: NORMAL /LPF
KETONES UR QL STRIP: NEGATIVE
LEUKOCYTE ESTERASE UR QL STRIP.AUTO: NEGATIVE
NITRITE UR QL STRIP: NEGATIVE
PH UR STRIP.AUTO: 7.5 [PH] (ref 5–8)
POTASSIUM SERPL-SCNC: 3.1 MMOL/L (ref 3.5–5.2)
PROT UR QL STRIP: ABNORMAL
RBC # UR: NORMAL /HPF
REF LAB TEST METHOD: NORMAL
SODIUM SERPL-SCNC: 141 MMOL/L (ref 136–145)
SP GR UR STRIP: 1.01 (ref 1–1.03)
SQUAMOUS #/AREA URNS HPF: NORMAL /HPF
UROBILINOGEN UR QL STRIP: ABNORMAL
WBC UR QL AUTO: NORMAL /HPF

## 2021-09-23 ENCOUNTER — TELEPHONE (OUTPATIENT)
Dept: FAMILY MEDICINE CLINIC | Facility: CLINIC | Age: 66
End: 2021-09-23

## 2021-09-23 DIAGNOSIS — E27.8 MASS OF ADRENAL GLAND (HCC): Primary | ICD-10-CM

## 2021-09-24 ENCOUNTER — TELEPHONE (OUTPATIENT)
Dept: FAMILY MEDICINE CLINIC | Facility: CLINIC | Age: 66
End: 2021-09-24

## 2021-09-24 DIAGNOSIS — E87.6 HYPOKALEMIA: ICD-10-CM

## 2021-09-24 RX ORDER — POTASSIUM CHLORIDE 1500 MG/1
40 TABLET, FILM COATED, EXTENDED RELEASE ORAL 2 TIMES DAILY
Qty: 120 TABLET | Refills: 1 | Status: SHIPPED | OUTPATIENT
Start: 2021-09-24 | End: 2021-11-19 | Stop reason: SDUPTHER

## 2021-09-24 NOTE — TELEPHONE ENCOUNTER
Called pt and informed him of what Dr Lin said.  He voiced understanding.    Sent in new script for him to take 2 tabs twice daily because he was already taking 2 tabs once daily.

## 2021-09-24 NOTE — TELEPHONE ENCOUNTER
----- Message from Rafat Lin MD sent at 9/22/2021 11:55 AM CDT -----  Labs significant for hypokalemia, please double potassium supplementation and we will recheck at f/u.

## 2021-09-30 ENCOUNTER — CLINICAL SUPPORT (OUTPATIENT)
Dept: FAMILY MEDICINE CLINIC | Facility: CLINIC | Age: 66
End: 2021-09-30

## 2021-09-30 DIAGNOSIS — Z23 FLU VACCINE NEED: Primary | ICD-10-CM

## 2021-09-30 PROCEDURE — G0008 ADMIN INFLUENZA VIRUS VAC: HCPCS | Performed by: STUDENT IN AN ORGANIZED HEALTH CARE EDUCATION/TRAINING PROGRAM

## 2021-09-30 PROCEDURE — 90662 IIV NO PRSV INCREASED AG IM: CPT | Performed by: STUDENT IN AN ORGANIZED HEALTH CARE EDUCATION/TRAINING PROGRAM

## 2021-10-08 NOTE — TELEPHONE ENCOUNTER
Pt called [facility]? I told Patient Dr Lin was not here today so it would be Monday before he would hear anything.

## 2021-10-11 NOTE — TELEPHONE ENCOUNTER
Rx Refill Note  Requested Prescriptions     Pending Prescriptions Disp Refills   • gabapentin (NEURONTIN) 800 MG tablet 90 tablet 2     Sig: Take 1 tablet by mouth 3 (Three) Times a Day.      Last office visit with prescribing clinician: 9/17/2021      Next office visit with prescribing clinician: 10/19/2021            Lindsay Soto Rep  10/11/21, 09:49 CDT

## 2021-10-13 RX ORDER — GABAPENTIN 800 MG/1
800 TABLET ORAL 3 TIMES DAILY
Qty: 90 TABLET | Refills: 2 | OUTPATIENT
Start: 2021-10-13

## 2021-10-15 DIAGNOSIS — N28.89 RENAL MASS, LEFT: Primary | ICD-10-CM

## 2021-10-19 ENCOUNTER — TELEPHONE (OUTPATIENT)
Dept: FAMILY MEDICINE CLINIC | Facility: CLINIC | Age: 66
End: 2021-10-19

## 2021-10-19 NOTE — TELEPHONE ENCOUNTER
"Dr Lin got the results of his CT. He said, \"CT with no evidence of adrenal mass. However indeterminate lesion was noted on left kidney. I have ordered renal ultrasound to further evaluate\".    Left detailed VM for pt stating we will call him back with an appt date and time for his renal ultrasound.      "

## 2021-10-20 DIAGNOSIS — E27.8 MASS OF ADRENAL GLAND (HCC): ICD-10-CM

## 2021-10-21 NOTE — TELEPHONE ENCOUNTER
11/1/21/@ 11:30 am at [facility].  Pt notified of appt and to drink a lot of water the day before to be well hydrated.

## 2021-10-26 ENCOUNTER — LAB (OUTPATIENT)
Dept: LAB | Facility: HOSPITAL | Age: 66
End: 2021-10-26

## 2021-10-26 ENCOUNTER — OFFICE VISIT (OUTPATIENT)
Dept: FAMILY MEDICINE CLINIC | Facility: CLINIC | Age: 66
End: 2021-10-26

## 2021-10-26 VITALS
OXYGEN SATURATION: 98 % | TEMPERATURE: 97.8 F | DIASTOLIC BLOOD PRESSURE: 82 MMHG | BODY MASS INDEX: 45.94 KG/M2 | HEIGHT: 68 IN | WEIGHT: 303.1 LBS | HEART RATE: 78 BPM | SYSTOLIC BLOOD PRESSURE: 142 MMHG

## 2021-10-26 DIAGNOSIS — E11.65 TYPE 2 DIABETES MELLITUS WITH HYPERGLYCEMIA, WITH LONG-TERM CURRENT USE OF INSULIN (HCC): ICD-10-CM

## 2021-10-26 DIAGNOSIS — E11.40 TYPE 2 DIABETES MELLITUS WITH DIABETIC NEUROPATHY, WITH LONG-TERM CURRENT USE OF INSULIN (HCC): ICD-10-CM

## 2021-10-26 DIAGNOSIS — Z79.899 HIGH RISK MEDICATION USE: ICD-10-CM

## 2021-10-26 DIAGNOSIS — E87.6 HYPOKALEMIA: ICD-10-CM

## 2021-10-26 DIAGNOSIS — Z79.4 TYPE 2 DIABETES MELLITUS WITH DIABETIC NEUROPATHY, WITH LONG-TERM CURRENT USE OF INSULIN (HCC): ICD-10-CM

## 2021-10-26 DIAGNOSIS — I10 PRIMARY HYPERTENSION: Primary | ICD-10-CM

## 2021-10-26 DIAGNOSIS — Z79.4 TYPE 2 DIABETES MELLITUS WITH HYPERGLYCEMIA, WITH LONG-TERM CURRENT USE OF INSULIN (HCC): ICD-10-CM

## 2021-10-26 LAB
AMPHET+METHAMPHET UR QL: NEGATIVE
AMPHETAMINES UR QL: NEGATIVE
ANION GAP SERPL CALCULATED.3IONS-SCNC: 10.3 MMOL/L (ref 5–15)
BARBITURATES UR QL SCN: NEGATIVE
BENZODIAZ UR QL SCN: NEGATIVE
BUN SERPL-MCNC: 21 MG/DL (ref 8–23)
BUN/CREAT SERPL: 15.3 (ref 7–25)
BUPRENORPHINE SERPL-MCNC: NEGATIVE NG/ML
CALCIUM SPEC-SCNC: 9.6 MG/DL (ref 8.6–10.5)
CANNABINOIDS SERPL QL: NEGATIVE
CHLORIDE SERPL-SCNC: 98 MMOL/L (ref 98–107)
CO2 SERPL-SCNC: 31.7 MMOL/L (ref 22–29)
COCAINE UR QL: NEGATIVE
CREAT SERPL-MCNC: 1.37 MG/DL (ref 0.76–1.27)
GFR SERPL CREATININE-BSD FRML MDRD: 63 ML/MIN/1.73
GLUCOSE SERPL-MCNC: 150 MG/DL (ref 65–99)
METHADONE UR QL SCN: NEGATIVE
OPIATES UR QL: NEGATIVE
OXYCODONE UR QL SCN: NEGATIVE
PCP UR QL SCN: NEGATIVE
POTASSIUM SERPL-SCNC: 3.3 MMOL/L (ref 3.5–5.2)
PROPOXYPH UR QL: NEGATIVE
SODIUM SERPL-SCNC: 140 MMOL/L (ref 136–145)
TRICYCLICS UR QL SCN: NEGATIVE

## 2021-10-26 PROCEDURE — 80048 BASIC METABOLIC PNL TOTAL CA: CPT | Performed by: STUDENT IN AN ORGANIZED HEALTH CARE EDUCATION/TRAINING PROGRAM

## 2021-10-26 PROCEDURE — 99214 OFFICE O/P EST MOD 30 MIN: CPT | Performed by: STUDENT IN AN ORGANIZED HEALTH CARE EDUCATION/TRAINING PROGRAM

## 2021-10-26 PROCEDURE — 80306 DRUG TEST PRSMV INSTRMNT: CPT | Performed by: STUDENT IN AN ORGANIZED HEALTH CARE EDUCATION/TRAINING PROGRAM

## 2021-10-26 RX ORDER — GABAPENTIN 800 MG/1
800 TABLET ORAL 3 TIMES DAILY
Qty: 90 TABLET | Refills: 2 | Status: SHIPPED | OUTPATIENT
Start: 2021-10-26 | End: 2022-06-24 | Stop reason: SDUPTHER

## 2021-10-26 NOTE — PROGRESS NOTES
"Subjective:  Azael Bray is a 66 y.o. male who presents for     Hypertension; currently on carvedilol 25 mg twice daily, hydralazine 50 mg 3 times daily, chlorthalidone 50 mg daily, nifedipine 60 mg daily, spironolactone 50 mg daily, potassium 20 MDQ twice daily.  Blood pressure today was 142/82, states blood pressure at home has been good.  Denied headache, vision changes, numbness, 20, weakness, chest pain, shortness of breath, leg swelling.  Recent CT angio completed due to concern for pheochromocytoma, adrenal mass, no adrenal mass noted however kidney mass was identified on exam.  Repeat ultrasound ordered but not obtained.  Patient denies hematuria, flank pain, dysuria.    Diabetes; currently on Trulicity 3 mg weekly, Lantus 60 units daily, no issues with medication, no adverse effects.  Blood glucose has been low 100s.  Previous A1c was 9.9% 2 months ago.  Also on gabapentin 800 mg 3 times daily for neuropathy, has been on for years, no adverse effects.    There is no problem list on file for this patient.    Vitals:    Vitals:    10/26/21 0934   BP: 142/82   Pulse: 78   Temp: 97.8 °F (36.6 °C)   TempSrc: Infrared   SpO2: 98%   Weight: (!) 137 kg (303 lb 1.6 oz)   Height: 172.7 cm (68\")     Body mass index is 46.09 kg/m².      Current Outpatient Medications:   •  carvedilol (COREG) 25 MG tablet, Take 25 mg by mouth 2 (Two) Times a Day With Meals., Disp: , Rfl:   •  chlorthalidone (HYGROTEN) 50 MG tablet, Take 50 mg by mouth Daily., Disp: , Rfl:   •  cholecalciferol (VITAMIN D3) 25 MCG (1000 UT) tablet, Take 1,250 Units by mouth Daily., Disp: , Rfl:   •  Dulaglutide 3 MG/0.5ML solution pen-injector, Inject 3 mg under the skin into the appropriate area as directed 1 (One) Time Per Week., Disp: 4 pen, Rfl: 3  •  gabapentin (NEURONTIN) 800 MG tablet, Take 1 tablet by mouth 3 (Three) Times a Day., Disp: 90 tablet, Rfl: 2  •  glucose blood test strip, Use daily and as need up to TID, Disp: 100 each, Rfl: " 5  •  glucose monitor monitoring kit, Use daily and as need up to TID, Disp: 1 each, Rfl: 0  •  hydrALAZINE (APRESOLINE) 50 MG tablet, Take 50 mg by mouth 3 (Three) Times a Day., Disp: , Rfl:   •  insulin glargine (Lantus) 100 UNIT/ML injection, Inject 60 Units under the skin into the appropriate area as directed Every Morning Before Breakfast., Disp: , Rfl:   •  Insulin Syringes, Disposable, U-100 0.3 ML misc, 1 each Daily., Disp: , Rfl:   •  Lancets (freestyle) lancets, Use daily and as need up to TID, Disp: 100 each, Rfl: 5  •  meloxicam (MOBIC) 15 MG tablet, Take 15 mg by mouth Daily., Disp: , Rfl:   •  NIFEdipine XL (PROCARDIA XL) 60 MG 24 hr tablet, Take 60 mg by mouth 2 (two) times a day., Disp: , Rfl:   •  Omega-3 Fatty Acids (fish oil) 1000 MG capsule capsule, Take 1,000 mg by mouth 2 (Two) Times a Day With Meals., Disp: , Rfl:   •  omeprazole (priLOSEC) 40 MG capsule, Take 1 capsule by mouth 2 (two) times a day., Disp: 60 capsule, Rfl: 2  •  potassium chloride ER (K-TAB) 20 MEQ tablet controlled-release ER tablet, Take 2 tablets by mouth 2 (Two) Times a Day., Disp: 120 tablet, Rfl: 1  •  rosuvastatin (CRESTOR) 40 MG tablet, Take 20 mg by mouth 3 (Three) Times a Week., Disp: , Rfl:   •  sildenafil (Viagra) 100 MG tablet, Take 1 tablet by mouth Daily As Needed for Erectile Dysfunction., Disp: 15 tablet, Rfl: 5  •  spironolactone (ALDACTONE) 100 MG tablet, Take 50 mg by mouth Daily., Disp: , Rfl:   •  vitamin B-12 (CYANOCOBALAMIN) 500 MCG tablet, Take 500 mcg by mouth Daily., Disp: , Rfl:     There is no problem list on file for this patient.    Past Surgical History:   Procedure Laterality Date   • APPENDECTOMY     • HERNIA REPAIR Left      Social History     Socioeconomic History   • Marital status:    Tobacco Use   • Smoking status: Former Smoker   • Smokeless tobacco: Never Used   Vaping Use   • Vaping Use: Never used   Substance and Sexual Activity   • Alcohol use: Not Currently   • Drug use:  Never   • Sexual activity: Not Currently     Family History   Problem Relation Age of Onset   • Diabetes Father    • Hypertension Father    • Heart disease Father    • Diabetes Paternal Grandfather      Office Visit on 09/17/2021   Component Date Value Ref Range Status   • Glucose 09/17/2021 99  65 - 99 mg/dL Final   • BUN 09/17/2021 23  8 - 23 mg/dL Final   • Creatinine 09/17/2021 1.09  0.76 - 1.27 mg/dL Final   • Sodium 09/17/2021 141  136 - 145 mmol/L Final   • Potassium 09/17/2021 3.1* 3.5 - 5.2 mmol/L Final   • Chloride 09/17/2021 97* 98 - 107 mmol/L Final   • CO2 09/17/2021 31.5* 22.0 - 29.0 mmol/L Final   • Calcium 09/17/2021 10.1  8.6 - 10.5 mg/dL Final   • eGFR   Amer 09/17/2021 82  >60 mL/min/1.73 Final   • BUN/Creatinine Ratio 09/17/2021 21.1  7.0 - 25.0 Final   • Anion Gap 09/17/2021 12.5  5.0 - 15.0 mmol/L Final   • Color, UA 09/17/2021 Yellow  Yellow, Straw Final   • Appearance, UA 09/17/2021 Clear  Clear Final   • pH, UA 09/17/2021 7.5  5.0 - 8.0 Final   • Specific Gravity, UA 09/17/2021 1.012  1.005 - 1.030 Final   • Glucose, UA 09/17/2021 Negative  Negative Final   • Ketones, UA 09/17/2021 Negative  Negative Final   • Bilirubin, UA 09/17/2021 Negative  Negative Final   • Blood, UA 09/17/2021 Trace* Negative Final   • Protein, UA 09/17/2021 30 mg/dL (1+)* Negative Final   • Leuk Esterase, UA 09/17/2021 Negative  Negative Final   • Nitrite, UA 09/17/2021 Negative  Negative Final   • Urobilinogen, UA 09/17/2021 0.2 E.U./dL  0.2 - 1.0 E.U./dL Final   • RBC, UA 09/17/2021 0-2  None Seen, 0-2 /HPF Final   • WBC, UA 09/17/2021 0-2  None Seen, 0-2 /HPF Final   • Bacteria, UA 09/17/2021 None Seen  None Seen /HPF Final   • Squamous Epithelial Cells, UA 09/17/2021 0-2  None Seen, 0-2 /HPF Final   • Hyaline Casts, UA 09/17/2021 None Seen  None Seen /LPF Final   • Methodology 09/17/2021 Automated Microscopy   Final   Office Visit on 08/24/2021   Component Date Value Ref Range Status   • Hemoglobin A1C  08/24/2021 9.9* % Final   • Lot Number 08/24/2021 [id number]   Final   • Expiration Date 08/24/2021 [id number]   Final   • Glucose 08/24/2021 159* 65 - 99 mg/dL Final   • BUN 08/24/2021 16  8 - 23 mg/dL Final   • Creatinine 08/24/2021 1.30* 0.76 - 1.27 mg/dL Final   • Sodium 08/24/2021 142  136 - 145 mmol/L Final   • Potassium 08/24/2021 2.9* 3.5 - 5.2 mmol/L Final   • Chloride 08/24/2021 96* 98 - 107 mmol/L Final   • CO2 08/24/2021 35.0* 22.0 - 29.0 mmol/L Final   • Calcium 08/24/2021 9.7  8.6 - 10.5 mg/dL Final   • Total Protein 08/24/2021 8.2  6.0 - 8.5 g/dL Final   • Albumin 08/24/2021 4.30  3.50 - 5.20 g/dL Final   • ALT (SGPT) 08/24/2021 22  1 - 41 U/L Final   • AST (SGOT) 08/24/2021 22  1 - 40 U/L Final   • Alkaline Phosphatase 08/24/2021 63  39 - 117 U/L Final   • Total Bilirubin 08/24/2021 0.6  0.0 - 1.2 mg/dL Final   • eGFR   Amer 08/24/2021 67  >60 mL/min/1.73 Final   • Globulin 08/24/2021 3.9  gm/dL Final   • A/G Ratio 08/24/2021 1.1  g/dL Final   • BUN/Creatinine Ratio 08/24/2021 12.3  7.0 - 25.0 Final   • Anion Gap 08/24/2021 11.0  5.0 - 15.0 mmol/L Final   • Total Cholesterol 08/24/2021 210* 0 - 200 mg/dL Final   • Triglycerides 08/24/2021 181* 0 - 150 mg/dL Final   • HDL Cholesterol 08/24/2021 41  40 - 60 mg/dL Final   • LDL Cholesterol  08/24/2021 136* 0 - 100 mg/dL Final   • VLDL Cholesterol 08/24/2021 33  5 - 40 mg/dL Final   • LDL/HDL Ratio 08/24/2021 3.24   Final   • Hepatitis C Ab 08/24/2021 0.4  0.0 - 0.9 s/co ratio Final   • TSH 08/24/2021 0.919  0.270 - 4.200 uIU/mL Final   • Microalbumin, Urine 08/24/2021 65.2  mg/dL Final   • WBC 08/24/2021 8.15  3.40 - 10.80 10*3/mm3 Final   • RBC 08/24/2021 6.56* 4.14 - 5.80 10*6/mm3 Final   • Hemoglobin 08/24/2021 16.6  13.0 - 17.7 g/dL Final   • Hematocrit 08/24/2021 52.0* 37.5 - 51.0 % Final   • MCV 08/24/2021 79.3  79.0 - 97.0 fL Final   • MCH 08/24/2021 25.3* 26.6 - 33.0 pg Final   • MCHC 08/24/2021 31.9  31.5 - 35.7 g/dL Final   • RDW 08/24/2021  16.8* 12.3 - 15.4 % Final   • RDW-SD 08/24/2021 41.6  37.0 - 54.0 fl Final   • MPV 08/24/2021 10.8  6.0 - 12.0 fL Final   • Platelets 08/24/2021 297  140 - 450 10*3/mm3 Final   • Neutrophil % 08/24/2021 55.9  42.7 - 76.0 % Final   • Lymphocyte % 08/24/2021 33.5  19.6 - 45.3 % Final   • Monocyte % 08/24/2021 8.8  5.0 - 12.0 % Final   • Eosinophil % 08/24/2021 1.0  0.3 - 6.2 % Final   • Basophil % 08/24/2021 0.6  0.0 - 1.5 % Final   • Immature Grans % 08/24/2021 0.2  0.0 - 0.5 % Final   • Neutrophils, Absolute 08/24/2021 4.55  1.70 - 7.00 10*3/mm3 Final   • Lymphocytes, Absolute 08/24/2021 2.73  0.70 - 3.10 10*3/mm3 Final   • Monocytes, Absolute 08/24/2021 0.72  0.10 - 0.90 10*3/mm3 Final   • Eosinophils, Absolute 08/24/2021 0.08  0.00 - 0.40 10*3/mm3 Final   • Basophils, Absolute 08/24/2021 0.05  0.00 - 0.20 10*3/mm3 Final   • Immature Grans, Absolute 08/24/2021 0.02  0.00 - 0.05 10*3/mm3 Final   • nRBC 08/24/2021 0.1  0.0 - 0.2 /100 WBC Final   • Interpretation 08/24/2021 Comment   Final    Negative  Not infected with HCV, unless recent infection is suspected or other  evidence exists to indicate HCV infection.      US Thyroid  Narrative: EXAM DESCRIPTION:  US THYROID 9/10/2021 2:00 PM CDT    [facility]: US THYROID     CLINICAL HISTORY:  65 years Male, left sided neck pain with swallowing., M54.2  Cervicalgia    COMPARISON:  None.    TECHNIQUE:  Real-time grayscale color Doppler images were obtained through  the thyroid gland.  37 images.    FINDINGS:  The right lobe of thyroid gland measures 1.9 x 2 x 5.8 cm.  The right lobe of thyroid gland is minimally heterogeneous.  Color Doppler imaging demonstrates flow to the right lobe of the  thyroid gland.  No focal lesions are perceived within the right lobe of thyroid  gland.    The left lobe of thyroid gland measures 2.2 x 2.6 x 5.5 cm.  The left lobe of thyroid gland is homogeneous.  Color Doppler imaging suggests flow to left lobe of thyroid  gland.    The thyroid  isthmus is homogeneous and normal 7 mm.  A left neck lymph node is present within the region of concern.  This lymph node appears normal, and measures 6 x 11 x 13 mm.  Impression: Normal    Electronically signed by:  Higinio Franco MD  9/12/2021 9:38 PM CDT  Workstation: [id number]      [unfilled]  Immunization History   Administered Date(s) Administered   • COVID-19 (MODERNA) 02/24/2021, 03/24/2021   • Fluzone High-Dose 65+yrs 09/30/2021     The following portions of the patient's history were reviewed and updated as appropriate: allergies, current medications, past family history, past medical history, past social history, past surgical history and problem list.    PHQ-9 Total Score:             Physical Exam  Constitutional:       General: He is not in acute distress.     Appearance: He is obese.   HENT:      Head: Normocephalic and atraumatic.      Mouth/Throat:      Pharynx: No posterior oropharyngeal erythema.   Cardiovascular:      Rate and Rhythm: Normal rate and regular rhythm.      Heart sounds: Normal heart sounds. No murmur heard.      Pulmonary:      Effort: Pulmonary effort is normal.      Breath sounds: Normal breath sounds.   Abdominal:      Palpations: Abdomen is soft.      Tenderness: There is no abdominal tenderness.   Musculoskeletal:         General: No swelling.      Right lower leg: No edema.      Left lower leg: No edema.   Skin:     Coloration: Skin is not jaundiced.      Findings: No rash.   Neurological:      Mental Status: He is alert and oriented to person, place, and time. Mental status is at baseline.   Psychiatric:         Mood and Affect: Mood normal.         Behavior: Behavior normal.         Assessment/Plan    Diagnosis Plan   1. Primary hypertension  Basic metabolic panel   2. Type 2 diabetes mellitus with hyperglycemia, with long-term current use of insulin (HCC)  Dulaglutide 3 MG/0.5ML solution pen-injector   3. Hypokalemia  Basic metabolic panel   4. High risk medication use   Urine Drug Screen - Urine, Clean Catch   5. Type 2 diabetes mellitus with diabetic neuropathy, with long-term current use of insulin (HCC)  gabapentin (NEURONTIN) 800 MG tablet      Orders Placed This Encounter   Procedures   • Basic metabolic panel     Order Specific Question:   Release to patient     Answer:   Immediate   • Urine Drug Screen - Urine, Clean Catch     Order Specific Question:   Release to patient     Answer:   Immediate     Diabetes; doing well with current medications, A1c at follow-up 1 month, states blood glucose has been low 100s, reassuring.  In addition, patient needs gabapentin 800 mg 3 times daily for diabetic neuropathy, tolerating medication well, no adverse effects, Chris reviewed and appropriate, UDS today.  Contract at follow-up.    Hypertension; still slightly elevated today however the lowest that it has been, states blood pressure has been well controlled at home.  Reassuring.  Will obtain BMP to rule out hypokalemia, or kidney dysfunction.  Has upcoming kidney ultrasound for incidental mass found on CT, no red flags, reassuring.          This document has been electronically signed by Rafat Lin MD on October 26, 2021 10:13 CDT

## 2021-11-03 ENCOUNTER — TELEPHONE (OUTPATIENT)
Dept: FAMILY MEDICINE CLINIC | Facility: CLINIC | Age: 66
End: 2021-11-03

## 2021-11-03 NOTE — TELEPHONE ENCOUNTER
Received ultrasound results. Dr Lin said there is a 2.5 cm left simple cyst. It is reassurring.    LVM

## 2021-11-04 DIAGNOSIS — N28.89 RENAL MASS, LEFT: ICD-10-CM

## 2021-11-10 ENCOUNTER — TELEPHONE (OUTPATIENT)
Dept: FAMILY MEDICINE CLINIC | Facility: CLINIC | Age: 66
End: 2021-11-10

## 2021-11-10 NOTE — TELEPHONE ENCOUNTER
Called pt and informed him of what Dr Lin said.  He voiced understanding.    He also let me know he got his 3rd booster shot on 11/2/21. Added to his immunizations.

## 2021-11-10 NOTE — TELEPHONE ENCOUNTER
----- Message from Rafat Lin MD sent at 11/9/2021  6:43 PM CST -----  Kidney function stable, potassium has improved but would recommend increasing potassium pills to 5 a day rather than 4.

## 2021-11-19 ENCOUNTER — OFFICE VISIT (OUTPATIENT)
Dept: FAMILY MEDICINE CLINIC | Facility: CLINIC | Age: 66
End: 2021-11-19

## 2021-11-19 VITALS
DIASTOLIC BLOOD PRESSURE: 96 MMHG | TEMPERATURE: 96.9 F | WEIGHT: 305 LBS | SYSTOLIC BLOOD PRESSURE: 170 MMHG | HEIGHT: 68 IN | OXYGEN SATURATION: 96 % | BODY MASS INDEX: 46.23 KG/M2 | HEART RATE: 72 BPM

## 2021-11-19 DIAGNOSIS — I10 PRIMARY HYPERTENSION: Primary | ICD-10-CM

## 2021-11-19 DIAGNOSIS — Z79.4 TYPE 2 DIABETES MELLITUS WITH HYPERGLYCEMIA, WITH LONG-TERM CURRENT USE OF INSULIN (HCC): ICD-10-CM

## 2021-11-19 DIAGNOSIS — E11.65 TYPE 2 DIABETES MELLITUS WITH HYPERGLYCEMIA, WITH LONG-TERM CURRENT USE OF INSULIN (HCC): ICD-10-CM

## 2021-11-19 DIAGNOSIS — E87.6 HYPOKALEMIA: ICD-10-CM

## 2021-11-19 PROCEDURE — 99214 OFFICE O/P EST MOD 30 MIN: CPT | Performed by: STUDENT IN AN ORGANIZED HEALTH CARE EDUCATION/TRAINING PROGRAM

## 2021-11-19 RX ORDER — POTASSIUM CHLORIDE 1500 MG/1
20 TABLET, FILM COATED, EXTENDED RELEASE ORAL 2 TIMES DAILY
Qty: 120 TABLET | Refills: 1
Start: 2021-11-19 | End: 2022-01-05 | Stop reason: SDUPTHER

## 2021-11-19 RX ORDER — LOSARTAN POTASSIUM 25 MG/1
25 TABLET ORAL DAILY
Qty: 60 TABLET | Refills: 0 | Status: SHIPPED | OUTPATIENT
Start: 2021-11-19 | End: 2021-12-07

## 2021-11-19 NOTE — PROGRESS NOTES
"Subjective:  Azael Bray is a 66 y.o. male who presents for     Hypertension; currently on carvedilol 25 mg twice daily, hydralazine 50 mg 3 times daily, chlorthalidone 50 mg daily, nifedipine 60 mg daily, spironolactone 50 mg daily, potassium 50 M EQ daily.  Blood pressure today was 170/96, states that he has not been checking his blood pressure at home.  Endorses adherence to CPAP machine.  Denied any chest pain, shortness of breath, headaches, vision changes, numbness, tingling, weakness.    DM; Currently on Trulicity 3 mg weekly and lantus 60u nightly. Does not check his blood sugar regularly, states has been as high as 210 when he does check it.  Denied polyphagia, polydipsia, polyuria.  No issues with medication.    There is no problem list on file for this patient.    Vitals:    Vitals:    11/19/21 0924   BP: 170/96   BP Location: Left arm   Patient Position: Sitting   Cuff Size: Large Adult   Pulse: 72   Temp: 96.9 °F (36.1 °C)   SpO2: 96%   Weight: (!) 138 kg (305 lb)   Height: 172.7 cm (68\")     Body mass index is 46.38 kg/m².      Current Outpatient Medications:   •  carvedilol (COREG) 25 MG tablet, Take 25 mg by mouth 2 (Two) Times a Day With Meals., Disp: , Rfl:   •  chlorthalidone (HYGROTEN) 50 MG tablet, Take 50 mg by mouth Daily., Disp: , Rfl:   •  cholecalciferol (VITAMIN D3) 25 MCG (1000 UT) tablet, Take 1,250 Units by mouth Daily., Disp: , Rfl:   •  gabapentin (NEURONTIN) 800 MG tablet, Take 1 tablet by mouth 3 (Three) Times a Day., Disp: 90 tablet, Rfl: 2  •  glucose blood test strip, Use daily and as need up to TID, Disp: 100 each, Rfl: 5  •  glucose monitor monitoring kit, Use daily and as need up to TID, Disp: 1 each, Rfl: 0  •  hydrALAZINE (APRESOLINE) 50 MG tablet, Take 50 mg by mouth 3 (Three) Times a Day., Disp: , Rfl:   •  insulin glargine (Lantus) 100 UNIT/ML injection, Inject 60 Units under the skin into the appropriate area as directed Every Morning Before Breakfast., Disp: , " Rfl:   •  Insulin Syringes, Disposable, U-100 0.3 ML misc, 1 each Daily., Disp: , Rfl:   •  Lancets (freestyle) lancets, Use daily and as need up to TID, Disp: 100 each, Rfl: 5  •  meloxicam (MOBIC) 15 MG tablet, Take 15 mg by mouth Daily., Disp: , Rfl:   •  NIFEdipine XL (PROCARDIA XL) 60 MG 24 hr tablet, Take 60 mg by mouth 2 (two) times a day., Disp: , Rfl:   •  Omega-3 Fatty Acids (fish oil) 1000 MG capsule capsule, Take 1,000 mg by mouth 2 (Two) Times a Day With Meals., Disp: , Rfl:   •  omeprazole (priLOSEC) 40 MG capsule, Take 1 capsule by mouth 2 (two) times a day., Disp: 60 capsule, Rfl: 2  •  potassium chloride ER (K-TAB) 20 MEQ tablet controlled-release ER tablet, Take 1 tablet by mouth 2 (Two) Times a Day., Disp: 120 tablet, Rfl: 1  •  rosuvastatin (CRESTOR) 40 MG tablet, Take 20 mg by mouth 3 (Three) Times a Week., Disp: , Rfl:   •  sildenafil (Viagra) 100 MG tablet, Take 1 tablet by mouth Daily As Needed for Erectile Dysfunction., Disp: 15 tablet, Rfl: 5  •  spironolactone (ALDACTONE) 100 MG tablet, Take 100 mg by mouth Daily., Disp: , Rfl:   •  vitamin B-12 (CYANOCOBALAMIN) 500 MCG tablet, Take 500 mcg by mouth Daily., Disp: , Rfl:   •  Dulaglutide 4.5 MG/0.5ML solution pen-injector, Inject 4.5 mg under the skin into the appropriate area as directed 1 (One) Time Per Week., Disp: 4 pen, Rfl: 1  •  losartan (Cozaar) 25 MG tablet, Take 1 tablet by mouth Daily., Disp: 60 tablet, Rfl: 0    There is no problem list on file for this patient.    Past Surgical History:   Procedure Laterality Date   • APPENDECTOMY     • HERNIA REPAIR Left      Social History     Socioeconomic History   • Marital status:    Tobacco Use   • Smoking status: Former Smoker   • Smokeless tobacco: Never Used   Vaping Use   • Vaping Use: Never used   Substance and Sexual Activity   • Alcohol use: Not Currently   • Drug use: Never   • Sexual activity: Not Currently     Family History   Problem Relation Age of Onset   • Diabetes  Father    • Hypertension Father    • Heart disease Father    • Diabetes Paternal Grandfather      Office Visit on 10/26/2021   Component Date Value Ref Range Status   • Glucose 10/26/2021 150* 65 - 99 mg/dL Final   • BUN 10/26/2021 21  8 - 23 mg/dL Final   • Creatinine 10/26/2021 1.37* 0.76 - 1.27 mg/dL Final   • Sodium 10/26/2021 140  136 - 145 mmol/L Final   • Potassium 10/26/2021 3.3* 3.5 - 5.2 mmol/L Final   • Chloride 10/26/2021 98  98 - 107 mmol/L Final   • CO2 10/26/2021 31.7* 22.0 - 29.0 mmol/L Final   • Calcium 10/26/2021 9.6  8.6 - 10.5 mg/dL Final   • eGFR   Amer 10/26/2021 63  >60 mL/min/1.73 Final   • BUN/Creatinine Ratio 10/26/2021 15.3  7.0 - 25.0 Final   • Anion Gap 10/26/2021 10.3  5.0 - 15.0 mmol/L Final   • THC, Screen, Urine 10/26/2021 Negative  Negative Final   • Phencyclidine (PCP), Urine 10/26/2021 Negative  Negative Final   • Cocaine Screen, Urine 10/26/2021 Negative  Negative Final   • Methamphetamine, Ur 10/26/2021 Negative  Negative Final   • Opiate Screen 10/26/2021 Negative  Negative Final   • Amphetamine Screen, Urine 10/26/2021 Negative  Negative Final   • Benzodiazepine Screen, Urine 10/26/2021 Negative  Negative Final   • Tricyclic Antidepressants Screen 10/26/2021 Negative  Negative Final   • Methadone Screen, Urine 10/26/2021 Negative  Negative Final   • Barbiturates Screen, Urine 10/26/2021 Negative  Negative Final   • Oxycodone Screen, Urine 10/26/2021 Negative  Negative Final   • Propoxyphene Screen 10/26/2021 Negative  Negative Final   • Buprenorphine, Screen, Urine 10/26/2021 Negative  Negative Final   Office Visit on 09/17/2021   Component Date Value Ref Range Status   • Glucose 09/17/2021 99  65 - 99 mg/dL Final   • BUN 09/17/2021 23  8 - 23 mg/dL Final   • Creatinine 09/17/2021 1.09  0.76 - 1.27 mg/dL Final   • Sodium 09/17/2021 141  136 - 145 mmol/L Final   • Potassium 09/17/2021 3.1* 3.5 - 5.2 mmol/L Final   • Chloride 09/17/2021 97* 98 - 107 mmol/L Final   • CO2  09/17/2021 31.5* 22.0 - 29.0 mmol/L Final   • Calcium 09/17/2021 10.1  8.6 - 10.5 mg/dL Final   • eGFR   Amer 09/17/2021 82  >60 mL/min/1.73 Final   • BUN/Creatinine Ratio 09/17/2021 21.1  7.0 - 25.0 Final   • Anion Gap 09/17/2021 12.5  5.0 - 15.0 mmol/L Final   • Color, UA 09/17/2021 Yellow  Yellow, Straw Final   • Appearance, UA 09/17/2021 Clear  Clear Final   • pH, UA 09/17/2021 7.5  5.0 - 8.0 Final   • Specific Gravity, UA 09/17/2021 1.012  1.005 - 1.030 Final   • Glucose, UA 09/17/2021 Negative  Negative Final   • Ketones, UA 09/17/2021 Negative  Negative Final   • Bilirubin, UA 09/17/2021 Negative  Negative Final   • Blood, UA 09/17/2021 Trace* Negative Final   • Protein, UA 09/17/2021 30 mg/dL (1+)* Negative Final   • Leuk Esterase, UA 09/17/2021 Negative  Negative Final   • Nitrite, UA 09/17/2021 Negative  Negative Final   • Urobilinogen, UA 09/17/2021 0.2 E.U./dL  0.2 - 1.0 E.U./dL Final   • RBC, UA 09/17/2021 0-2  None Seen, 0-2 /HPF Final   • WBC, UA 09/17/2021 0-2  None Seen, 0-2 /HPF Final   • Bacteria, UA 09/17/2021 None Seen  None Seen /HPF Final   • Squamous Epithelial Cells, UA 09/17/2021 0-2  None Seen, 0-2 /HPF Final   • Hyaline Casts, UA 09/17/2021 None Seen  None Seen /LPF Final   • Methodology 09/17/2021 Automated Microscopy   Final   Office Visit on 08/24/2021   Component Date Value Ref Range Status   • Hemoglobin A1C 08/24/2021 9.9* % Final   • Lot Number 08/24/2021 [id number]   Final   • Expiration Date 08/24/2021 [id number]   Final   • Glucose 08/24/2021 159* 65 - 99 mg/dL Final   • BUN 08/24/2021 16  8 - 23 mg/dL Final   • Creatinine 08/24/2021 1.30* 0.76 - 1.27 mg/dL Final   • Sodium 08/24/2021 142  136 - 145 mmol/L Final   • Potassium 08/24/2021 2.9* 3.5 - 5.2 mmol/L Final   • Chloride 08/24/2021 96* 98 - 107 mmol/L Final   • CO2 08/24/2021 35.0* 22.0 - 29.0 mmol/L Final   • Calcium 08/24/2021 9.7  8.6 - 10.5 mg/dL Final   • Total Protein 08/24/2021 8.2  6.0 - 8.5 g/dL Final   •  Albumin 08/24/2021 4.30  3.50 - 5.20 g/dL Final   • ALT (SGPT) 08/24/2021 22  1 - 41 U/L Final   • AST (SGOT) 08/24/2021 22  1 - 40 U/L Final   • Alkaline Phosphatase 08/24/2021 63  39 - 117 U/L Final   • Total Bilirubin 08/24/2021 0.6  0.0 - 1.2 mg/dL Final   • eGFR   Amer 08/24/2021 67  >60 mL/min/1.73 Final   • Globulin 08/24/2021 3.9  gm/dL Final   • A/G Ratio 08/24/2021 1.1  g/dL Final   • BUN/Creatinine Ratio 08/24/2021 12.3  7.0 - 25.0 Final   • Anion Gap 08/24/2021 11.0  5.0 - 15.0 mmol/L Final   • Total Cholesterol 08/24/2021 210* 0 - 200 mg/dL Final   • Triglycerides 08/24/2021 181* 0 - 150 mg/dL Final   • HDL Cholesterol 08/24/2021 41  40 - 60 mg/dL Final   • LDL Cholesterol  08/24/2021 136* 0 - 100 mg/dL Final   • VLDL Cholesterol 08/24/2021 33  5 - 40 mg/dL Final   • LDL/HDL Ratio 08/24/2021 3.24   Final   • Hepatitis C Ab 08/24/2021 0.4  0.0 - 0.9 s/co ratio Final   • TSH 08/24/2021 0.919  0.270 - 4.200 uIU/mL Final   • Microalbumin, Urine 08/24/2021 65.2  mg/dL Final   • WBC 08/24/2021 8.15  3.40 - 10.80 10*3/mm3 Final   • RBC 08/24/2021 6.56* 4.14 - 5.80 10*6/mm3 Final   • Hemoglobin 08/24/2021 16.6  13.0 - 17.7 g/dL Final   • Hematocrit 08/24/2021 52.0* 37.5 - 51.0 % Final   • MCV 08/24/2021 79.3  79.0 - 97.0 fL Final   • MCH 08/24/2021 25.3* 26.6 - 33.0 pg Final   • MCHC 08/24/2021 31.9  31.5 - 35.7 g/dL Final   • RDW 08/24/2021 16.8* 12.3 - 15.4 % Final   • RDW-SD 08/24/2021 41.6  37.0 - 54.0 fl Final   • MPV 08/24/2021 10.8  6.0 - 12.0 fL Final   • Platelets 08/24/2021 297  140 - 450 10*3/mm3 Final   • Neutrophil % 08/24/2021 55.9  42.7 - 76.0 % Final   • Lymphocyte % 08/24/2021 33.5  19.6 - 45.3 % Final   • Monocyte % 08/24/2021 8.8  5.0 - 12.0 % Final   • Eosinophil % 08/24/2021 1.0  0.3 - 6.2 % Final   • Basophil % 08/24/2021 0.6  0.0 - 1.5 % Final   • Immature Grans % 08/24/2021 0.2  0.0 - 0.5 % Final   • Neutrophils, Absolute 08/24/2021 4.55  1.70 - 7.00 10*3/mm3 Final   •  Lymphocytes, Absolute 08/24/2021 2.73  0.70 - 3.10 10*3/mm3 Final   • Monocytes, Absolute 08/24/2021 0.72  0.10 - 0.90 10*3/mm3 Final   • Eosinophils, Absolute 08/24/2021 0.08  0.00 - 0.40 10*3/mm3 Final   • Basophils, Absolute 08/24/2021 0.05  0.00 - 0.20 10*3/mm3 Final   • Immature Grans, Absolute 08/24/2021 0.02  0.00 - 0.05 10*3/mm3 Final   • nRBC 08/24/2021 0.1  0.0 - 0.2 /100 WBC Final   • Interpretation 08/24/2021 Comment   Final    Negative  Not infected with HCV, unless recent infection is suspected or other  evidence exists to indicate HCV infection.      US Thyroid  Narrative: EXAM DESCRIPTION:  US THYROID 9/10/2021 2:00 PM CDT    [facility]: US THYROID     CLINICAL HISTORY:  65 years Male, left sided neck pain with swallowing., M54.2  Cervicalgia    COMPARISON:  None.    TECHNIQUE:  Real-time grayscale color Doppler images were obtained through  the thyroid gland.  37 images.    FINDINGS:  The right lobe of thyroid gland measures 1.9 x 2 x 5.8 cm.  The right lobe of thyroid gland is minimally heterogeneous.  Color Doppler imaging demonstrates flow to the right lobe of the  thyroid gland.  No focal lesions are perceived within the right lobe of thyroid  gland.    The left lobe of thyroid gland measures 2.2 x 2.6 x 5.5 cm.  The left lobe of thyroid gland is homogeneous.  Color Doppler imaging suggests flow to left lobe of thyroid  gland.    The thyroid isthmus is homogeneous and normal 7 mm.  A left neck lymph node is present within the region of concern.  This lymph node appears normal, and measures 6 x 11 x 13 mm.  Impression: Normal    Electronically signed by:  Higinio Franco MD  9/12/2021 9:38 PM CDT  Workstation: [id number]      @[email or web address]@  Immunization History   Administered Date(s) Administered   • COVID-19 (MODERNA) 1st, 2nd, 3rd Dose Only 02/24/2021, 03/24/2021, 11/02/2021   • Fluzone High-Dose 65+yrs 09/30/2021     The following portions of the patient's history were reviewed and updated as  appropriate: allergies, current medications, past family history, past medical history, past social history, past surgical history and problem list.    PHQ-9 Total Score:           Physical Exam  Constitutional:       General: He is not in acute distress.  HENT:      Head: Normocephalic and atraumatic.   Cardiovascular:      Rate and Rhythm: Normal rate and regular rhythm.      Heart sounds: Normal heart sounds. No murmur heard.      Pulmonary:      Effort: Pulmonary effort is normal.      Breath sounds: Normal breath sounds.   Abdominal:      Palpations: Abdomen is soft.      Tenderness: There is no abdominal tenderness.   Musculoskeletal:         General: No swelling.      Right lower leg: No edema.      Left lower leg: No edema.   Skin:     Coloration: Skin is not jaundiced.      Findings: No rash.   Neurological:      Mental Status: He is alert and oriented to person, place, and time. Mental status is at baseline.   Psychiatric:         Mood and Affect: Mood normal.         Behavior: Behavior normal.       Assessment/Plan    Diagnosis Plan   1. Primary hypertension  losartan (Cozaar) 25 MG tablet    Basic metabolic panel   2. Type 2 diabetes mellitus with hyperglycemia, with long-term current use of insulin (HCC)  Dulaglutide 4.5 MG/0.5ML solution pen-injector   3. Hypokalemia  potassium chloride ER (K-TAB) 20 MEQ tablet controlled-release ER tablet      Orders Placed This Encounter   Procedures   • Basic metabolic panel     Order Specific Question:   Release to patient     Answer:   Immediate     Hypertension; will start on losartan, decrease potassium supplementation due to concern for hyperkalemia, repeat labs 2 days following start of medication, strict ER/return precautions given.  Counseled on importance of blood pressure checks, to bring in blood pressure log to follow-up in 2 weeks.    Type 2 diabetes; too early for repeat A1c, will check at follow-up, patient states blood glucose is still been elevated  when he does drink, counseled on importance, to bring in blood glucose log to follow-up in 2 weeks.  Increase Trulicity to 4.5 mg weekly.  Counseled on possible need for decrease of insulin, patient voiced understanding, agreeable to plan.         This document has been electronically signed by Rafat Lin MD on November 19, 2021 13:09 CST

## 2021-11-30 ENCOUNTER — OFFICE VISIT (OUTPATIENT)
Dept: GASTROENTEROLOGY | Facility: CLINIC | Age: 66
End: 2021-11-30

## 2021-11-30 VITALS
BODY MASS INDEX: 46.23 KG/M2 | HEART RATE: 79 BPM | DIASTOLIC BLOOD PRESSURE: 95 MMHG | SYSTOLIC BLOOD PRESSURE: 169 MMHG | HEIGHT: 68 IN | WEIGHT: 305 LBS

## 2021-11-30 DIAGNOSIS — R11.0 NAUSEA: ICD-10-CM

## 2021-11-30 DIAGNOSIS — K21.00 GASTROESOPHAGEAL REFLUX DISEASE WITH ESOPHAGITIS WITHOUT HEMORRHAGE: ICD-10-CM

## 2021-11-30 DIAGNOSIS — R13.10 DYSPHAGIA, UNSPECIFIED TYPE: Primary | ICD-10-CM

## 2021-11-30 PROCEDURE — 99214 OFFICE O/P EST MOD 30 MIN: CPT | Performed by: PHYSICIAN ASSISTANT

## 2021-12-07 ENCOUNTER — OFFICE VISIT (OUTPATIENT)
Dept: FAMILY MEDICINE CLINIC | Facility: CLINIC | Age: 66
End: 2021-12-07

## 2021-12-07 VITALS
TEMPERATURE: 97.5 F | OXYGEN SATURATION: 98 % | HEIGHT: 68 IN | DIASTOLIC BLOOD PRESSURE: 68 MMHG | BODY MASS INDEX: 46.83 KG/M2 | HEART RATE: 72 BPM | SYSTOLIC BLOOD PRESSURE: 112 MMHG | WEIGHT: 309 LBS

## 2021-12-07 DIAGNOSIS — Z79.4 TYPE 2 DIABETES MELLITUS WITH HYPERGLYCEMIA, WITH LONG-TERM CURRENT USE OF INSULIN (HCC): Primary | ICD-10-CM

## 2021-12-07 DIAGNOSIS — E78.5 HYPERLIPIDEMIA, UNSPECIFIED HYPERLIPIDEMIA TYPE: ICD-10-CM

## 2021-12-07 DIAGNOSIS — E11.9 ENCOUNTER FOR DIABETIC FOOT EXAM (HCC): ICD-10-CM

## 2021-12-07 DIAGNOSIS — Z13.220 LIPID SCREENING: ICD-10-CM

## 2021-12-07 DIAGNOSIS — N52.9 ERECTILE DYSFUNCTION, UNSPECIFIED ERECTILE DYSFUNCTION TYPE: ICD-10-CM

## 2021-12-07 DIAGNOSIS — K21.9 GASTROESOPHAGEAL REFLUX DISEASE, UNSPECIFIED WHETHER ESOPHAGITIS PRESENT: ICD-10-CM

## 2021-12-07 DIAGNOSIS — E11.65 TYPE 2 DIABETES MELLITUS WITH HYPERGLYCEMIA, WITH LONG-TERM CURRENT USE OF INSULIN (HCC): Primary | ICD-10-CM

## 2021-12-07 DIAGNOSIS — I10 PRIMARY HYPERTENSION: ICD-10-CM

## 2021-12-07 LAB
EXPIRATION DATE: ABNORMAL
HBA1C MFR BLD: 9 %
Lab: ABNORMAL

## 2021-12-07 PROCEDURE — 3046F HEMOGLOBIN A1C LEVEL >9.0%: CPT | Performed by: STUDENT IN AN ORGANIZED HEALTH CARE EDUCATION/TRAINING PROGRAM

## 2021-12-07 PROCEDURE — 83036 HEMOGLOBIN GLYCOSYLATED A1C: CPT | Performed by: STUDENT IN AN ORGANIZED HEALTH CARE EDUCATION/TRAINING PROGRAM

## 2021-12-07 PROCEDURE — 99214 OFFICE O/P EST MOD 30 MIN: CPT | Performed by: STUDENT IN AN ORGANIZED HEALTH CARE EDUCATION/TRAINING PROGRAM

## 2021-12-07 RX ORDER — OMEPRAZOLE 40 MG/1
40 CAPSULE, DELAYED RELEASE ORAL DAILY
Qty: 90 CAPSULE | Refills: 1 | Status: SHIPPED | OUTPATIENT
Start: 2021-12-07 | End: 2022-01-05 | Stop reason: SDUPTHER

## 2021-12-07 RX ORDER — HYDRALAZINE HYDROCHLORIDE 50 MG/1
50 TABLET, FILM COATED ORAL 3 TIMES DAILY
Qty: 180 TABLET | Refills: 2 | Status: SHIPPED | OUTPATIENT
Start: 2021-12-07 | End: 2022-01-05 | Stop reason: SDUPTHER

## 2021-12-07 RX ORDER — SPIRONOLACTONE 100 MG/1
100 TABLET, FILM COATED ORAL DAILY
Qty: 90 TABLET | Refills: 1 | Status: SHIPPED | OUTPATIENT
Start: 2021-12-07 | End: 2022-01-05 | Stop reason: SDUPTHER

## 2021-12-07 RX ORDER — ROSUVASTATIN CALCIUM 40 MG/1
20 TABLET, COATED ORAL 3 TIMES WEEKLY
Qty: 90 TABLET | Refills: 1 | Status: SHIPPED | OUTPATIENT
Start: 2021-12-08 | End: 2022-01-05 | Stop reason: SDUPTHER

## 2021-12-07 RX ORDER — SILDENAFIL 100 MG/1
100 TABLET, FILM COATED ORAL DAILY PRN
Qty: 15 TABLET | Refills: 5 | Status: SHIPPED | OUTPATIENT
Start: 2021-12-07 | End: 2022-01-05 | Stop reason: SDUPTHER

## 2021-12-07 RX ORDER — CARVEDILOL 25 MG/1
25 TABLET ORAL 2 TIMES DAILY WITH MEALS
Qty: 180 TABLET | Refills: 1 | Status: SHIPPED | OUTPATIENT
Start: 2021-12-07 | End: 2022-01-05 | Stop reason: SDUPTHER

## 2021-12-07 NOTE — PATIENT INSTRUCTIONS
Type 2 Diabetes Mellitus, Self-Care, Adult  When you have type 2 diabetes (type 2 diabetes mellitus), you must make sure your blood sugar (glucose) stays in a healthy range. You can do this with:  · Nutrition.  · Exercise.  · Lifestyle changes.  · Medicines or insulin, if needed.  · Support from your doctors and others.  What are the risks?  Having diabetes can raise your risk for other long-term (chronic) health problems. You may get medicines to help prevent these problems.  How to stay aware of blood sugar    · Check your blood sugar level every day, as often as told.  · Have your A1C (hemoglobin A1C) level checked two or more times a year. Have it checked more often if told.  · Your doctor will set personal treatment goals for you. In general, you should have these blood sugar levels:  ? Before meals:  mg/dL (4.4-7.2 mmol/L).  ? After meals: below 180 mg/dL (10 mmol/L).  ? A1C: less than 7%.  How to manage high and low blood sugar  Symptoms of high blood sugar  High blood sugar is also called hyperglycemia. Know the symptoms of high blood sugar. These may include:  · More thirst.  · Hunger.  · Feeling very tired.  · Needing to pee (urinate) more often than normal.  · Seeing things blurry.  Symptoms of low blood sugar  Low blood sugar is also called hypoglycemia. This is when blood sugar is at or below 70 mg/dL (3.9 mmol/L). Symptoms may include:  · Hunger.  · Feeling worried or nervous (anxious).  · Feeling sweaty and cold to the touch (clammy).  · Being dizzy or light-headed.  · Feeling sleepy.  · A fast heartbeat.  · Feeling grouchy (irritable).  · Tingling or loss of feeling (numbness) around your mouth, lips, or tongue.  · Restless sleep.  Diabetes medicines can cause low blood sugar. You are more at risk:  · While you exercise.  · After exercise.  · During sleep.  · When you are sick.  · When you skip meals or do not eat for a long time.  Treating low blood sugar  If you think you have low blood  sugar, eat or drink something sugary right away. Keep 15 grams of a fast-acting carb (carbohydrate) with you all the time. Make sure your family and friends know how to treat you if you cannot treat yourself.  Treating very low blood sugar  Severe hypoglycemia is when your blood sugar is at or below 54 mg/dL (3 mmol/L). Severe hypoglycemia is an emergency. Do not wait to see if the symptoms will go away. Get medical help right away. Call your local emergency services (911 in the [place] Do not drive yourself to the hospital.  You may need a glucagon shot if you have very low blood sugar and you cannot eat or drink. Have a family member or friend learn how to check your blood sugar and how to give you a glucagon shot. Ask your doctor if you should have a kit for glucagon shots.  Follow these instructions at home:  Medicines  · Take diabetes medicines as told. If your doctor prescribed insulin or diabetes medicines, take them each day.  · Do not run out of insulin or other medicines. Plan ahead.  · If you use insulin, change the amount you take based on how active you are and what foods you eat. Your doctor will tell you how to do this.  · Take over-the-counter and prescription medicines only as told by your doctor.  Eating and drinking    · Eat healthy foods. These include:  ? Low-fat (lean) proteins.  ? Complex carbs, such as whole grains.  ? Fresh fruits and vegetables.  ? Low-fat dairy products.  ? Healthy fats.  · Meet with a food expert (dietitian) to make an eating plan.  · Follow instructions from your doctor about what you cannot eat or drink.  · Drink enough fluid to keep your pee (urine) pale yellow.  · Keep track of carbs that you eat. Read food labels and learn serving sizes of foods.  · Follow your sick-day plan when you cannot eat or drink as normal. Make this plan with your doctor so it is ready to use.    Activity  · Exercise as told by your doctor. You may need to:  ? Do stretching and strength  exercises 2 or more times a week.  ? Do 150 minutes or more of exercise each week that makes your heart beat faster and makes you sweat.  § Spread out your exercise over 3 or more days a week.  § Do not go more than 2 days in a row without exercise.  · Talk with your doctor before you start a new exercise. Your doctor may tell you to change:  ? How much insulin or medicines you take.  ? How much food you eat.  Lifestyle  · Do not use any products that contain nicotine or tobacco, such as cigarettes, e-cigarettes, and chewing tobacco. If you need help quitting, ask your doctor.  · If you drink alcohol and your doctor says alcohol is safe for you:  ? Limit how much you use to:  § 0-1 drink a day for women who are not pregnant.  § 0-2 drinks a day for men.  ? Be aware of how much alcohol is in your drink. In the [place] one drink equals one 12 oz bottle of beer (355 mL), one 5 oz glass of wine (148 mL), or one 1½ oz glass of hard liquor (44 mL).  · Learn to deal with stress. If you need help, ask your doctor.  Body care    · Stay up to date with your shots (immunizations).  · Have your eyes and feet checked by a doctor as often as told.  · Check your skin and feet every day. Check for cuts, bruises, redness, blisters, or sores.  · Brush your teeth and gums two times a day. Floss one or more times a day.  · Go to the dentist one or more times every 6 months.  · Stay at a healthy weight.    General instructions  · Share your diabetes care plan with:  ? Your work or school.  ? People you live with.  · Carry a card or wear jewelry that says you have diabetes.  · Keep all follow-up visits as told by your doctor. This is important.  Questions to ask your doctor  · Do I need to meet with a certified expert in diabetes education and care?  · Where can I find a support group?  Where to find more information  · American Diabetes Association: www.diabetes.org  · American Association of Diabetes Care and Education Specialists:  www.diabeteseducator.org  · International Diabetes Federation: www.idf.org  Summary  · When you have type 2 diabetes, you must make sure your blood sugar (glucose) stays in a healthy range. You can do this with nutrition, exercise, medicines and insulin, and support from doctors and others.  · Check your blood sugar every day, as often as told.  · Having diabetes can raise your risk for other long-term health problems. You may get medicines to help prevent these problems.  · Share your diabetes management plan with people at work, school, and home.  · Keep all follow-up visits as told by your doctor. This is important.  This information is not intended to replace advice given to you by your health care provider. Make sure you discuss any questions you have with your health care provider.  Document Revised: 01/26/2021 Document Reviewed: 01/26/2021  [employer] Patient Education © 2021 [employer]      Hypertension, Adult  Hypertension is another name for high blood pressure. High blood pressure forces your heart to work harder to pump blood. This can cause problems over time.  There are two numbers in a blood pressure reading. There is a top number (systolic) over a bottom number (diastolic). It is best to have a blood pressure that is below 120/80. Healthy choices can help lower your blood pressure, or you may need medicine to help lower it.  What are the causes?  The cause of this condition is not known. Some conditions may be related to high blood pressure.  What increases the risk?  · Smoking.  · Having type 2 diabetes mellitus, high cholesterol, or both.  · Not getting enough exercise or physical activity.  · Being overweight.  · Having too much fat, sugar, calories, or salt (sodium) in your diet.  · Drinking too much alcohol.  · Having long-term (chronic) kidney disease.  · Having a family history of high blood pressure.  · Age. Risk increases with age.  · Race. You may be at higher risk if you are   American.  · Gender. Men are at higher risk than women before age 45. After age 65, women are at higher risk than men.  · Having obstructive sleep apnea.  · Stress.  What are the signs or symptoms?  · High blood pressure may not cause symptoms. Very high blood pressure (hypertensive crisis) may cause:  ? Headache.  ? Feelings of worry or nervousness (anxiety).  ? Shortness of breath.  ? Nosebleed.  ? A feeling of being sick to your stomach (nausea).  ? Throwing up (vomiting).  ? Changes in how you see.  ? Very bad chest pain.  ? Seizures.  How is this treated?  · This condition is treated by making healthy lifestyle changes, such as:  ? Eating healthy foods.  ? Exercising more.  ? Drinking less alcohol.  · Your health care provider may prescribe medicine if lifestyle changes are not enough to get your blood pressure under control, and if:  ? Your top number is above 130.  ? Your bottom number is above 80.  · Your personal target blood pressure may vary.  Follow these instructions at home:  Eating and drinking    · If told, follow the DASH eating plan. To follow this plan:  ? Fill one half of your plate at each meal with fruits and vegetables.  ? Fill one fourth of your plate at each meal with whole grains. Whole grains include whole-wheat pasta, brown rice, and whole-grain bread.  ? Eat or drink low-fat dairy products, such as skim milk or low-fat yogurt.  ? Fill one fourth of your plate at each meal with low-fat (lean) proteins. Low-fat proteins include fish, chicken without skin, eggs, beans, and tofu.  ? Avoid fatty meat, cured and processed meat, or chicken with skin.  ? Avoid pre-made or processed food.  · Eat less than 1,500 mg of salt each day.  · Do not drink alcohol if:  ? Your doctor tells you not to drink.  ? You are pregnant, may be pregnant, or are planning to become pregnant.  · If you drink alcohol:  ? Limit how much you use to:  § 0-1 drink a day for women.  § 0-2 drinks a day for men.  ? Be aware of  how much alcohol is in your drink. In the [place] one drink equals one 12 oz bottle of beer (355 mL), one 5 oz glass of wine (148 mL), or one 1½ oz glass of hard liquor (44 mL).    Lifestyle    · Work with your doctor to stay at a healthy weight or to lose weight. Ask your doctor what the best weight is for you.  · Get at least 30 minutes of exercise most days of the week. This may include walking, swimming, or biking.  · Get at least 30 minutes of exercise that strengthens your muscles (resistance exercise) at least 3 days a week. This may include lifting weights or doing Pilates.  · Do not use any products that contain nicotine or tobacco, such as cigarettes, e-cigarettes, and chewing tobacco. If you need help quitting, ask your doctor.  · Check your blood pressure at home as told by your doctor.  · Keep all follow-up visits as told by your doctor. This is important.    Medicines  · Take over-the-counter and prescription medicines only as told by your doctor. Follow directions carefully.  · Do not skip doses of blood pressure medicine. The medicine does not work as well if you skip doses. Skipping doses also puts you at risk for problems.  · Ask your doctor about side effects or reactions to medicines that you should watch for.  Contact a doctor if you:  · Think you are having a reaction to the medicine you are taking.  · Have headaches that keep coming back (recurring).  · Feel dizzy.  · Have swelling in your ankles.  · Have trouble with your vision.  Get help right away if you:  · Get a very bad headache.  · Start to feel mixed up (confused).  · Feel weak or numb.  · Feel faint.  · Have very bad pain in your:  ? Chest.  ? Belly (abdomen).  · Throw up more than once.  · Have trouble breathing.  Summary  · Hypertension is another name for high blood pressure.  · High blood pressure forces your heart to work harder to pump blood.  · For most people, a normal blood pressure is less than 120/80.  · Making healthy  choices can help lower blood pressure. If your blood pressure does not get lower with healthy choices, you may need to take medicine.  This information is not intended to replace advice given to you by your health care provider. Make sure you discuss any questions you have with your health care provider.  Document Revised: 08/28/2019 Document Reviewed: 08/28/2019  [employer] Patient Education © 2021 [employer]      Obesity, Adult  Obesity is having too much body fat. Being obese means that your weight is more than what is healthy for you.  BMI is a number that explains how much body fat you have. If you have a BMI of 30 or more, you are obese. Obesity is often caused by eating or drinking more calories than your body uses. Changing your lifestyle can help you lose weight.  Obesity can cause serious health problems, such as:  · Stroke.  · Coronary artery disease (CAD).  · Type 2 diabetes.  · Some types of cancer, including cancers of the colon, breast, uterus, and gallbladder.  · Osteoarthritis.  · High blood pressure (hypertension).  · High cholesterol.  · Sleep apnea.  · Gallbladder stones.  · Infertility problems.  What are the causes?  · Eating meals each day that are high in calories, sugar, and fat.  · Being born with genes that may make you more likely to become obese.  · Having a medical condition that causes obesity.  · Taking certain medicines.  · Sitting a lot (having a sedentary lifestyle).  · Not getting enough sleep.  · Drinking a lot of drinks that have sugar in them.  What increases the risk?  · Having a family history of obesity.  · Being an  woman.  · Being a  man.  · Living in an area with limited access to:  ? [place], recreation centers, or sidewalks.  ? Healthy food choices, such as grocery stores and [employer] markets.  What are the signs or symptoms?  The main sign is having too much body fat.  How is this treated?  · Treatment for this condition often includes changing  your lifestyle. Treatment may include:  ? Changing your diet. This may include making a healthy meal plan.  ? Exercise. This may include activity that causes your heart to beat faster (aerobic exercise) and strength training. Work with your doctor to design a program that works for you.  ? Medicine to help you lose weight. This may be used if you are not able to lose 1 pound a week after 6 weeks of healthy eating and more exercise.  ? Treating conditions that cause the obesity.  ? Surgery. Options may include gastric banding and gastric bypass. This may be done if:  § Other treatments have not helped to improve your condition.  § You have a BMI of 40 or higher.  § You have life-threatening health problems related to obesity.  Follow these instructions at home:  Eating and drinking    · Follow advice from your doctor about what to eat and drink. Your doctor may tell you to:  ? Limit fast food, sweets, and processed snack foods.  ? Choose low-fat options. For example, choose low-fat milk instead of whole milk.  ? Eat 5 or more servings of fruits or vegetables each day.  ? Eat at home more often. This gives you more control over what you eat.  ? Choose healthy foods when you eat out.  ? Learn to read food labels. This will help you learn how much food is in 1 serving.  ? Keep low-fat snacks available.  ? Avoid drinks that have a lot of sugar in them. These include soda, fruit juice, iced tea with sugar, and flavored milk.  · Drink enough water to keep your pee (urine) pale yellow.  · Do not go on fad diets.    Physical activity  · Exercise often, as told by your doctor. Most adults should get up to 150 minutes of moderate-intensity exercise every week.Ask your doctor:  ? What types of exercise are safe for you.  ? How often you should exercise.  · Warm up and stretch before being active.  · Do slow stretching after being active (cool down).  · Rest between times of being active.  Lifestyle  · Work with your doctor and  a food expert (dietitian) to set a weight-loss goal that is best for you.  · Limit your screen time.  · Find ways to reward yourself that do not involve food.  · Do not drink alcohol if:  ? Your doctor tells you not to drink.  ? You are pregnant, may be pregnant, or are planning to become pregnant.  · If you drink alcohol:  ? Limit how much you use to:  § 0-1 drink a day for women.  § 0-2 drinks a day for men.  ? Be aware of how much alcohol is in your drink. In the [place] one drink equals one 12 oz bottle of beer (355 mL), one 5 oz glass of wine (148 mL), or one 1½ oz glass of hard liquor (44 mL).  General instructions  · Keep a weight-loss journal. This can help you keep track of:  ? The food that you eat.  ? How much exercise you get.  · Take over-the-counter and prescription medicines only as told by your doctor.  · Take vitamins and supplements only as told by your doctor.  · Think about joining a support group.  · Keep all follow-up visits as told by your doctor. This is important.  Contact a doctor if:  · You cannot meet your weight loss goal after you have changed your diet and lifestyle for 6 weeks.  Get help right away if you:  · Are having trouble breathing.  · Are having thoughts of harming yourself.  Summary  · Obesity is having too much body fat.  · Being obese means that your weight is more than what is healthy for you.  · Work with your doctor to set a weight-loss goal.  · Get regular exercise as told by your doctor.  This information is not intended to replace advice given to you by your health care provider. Make sure you discuss any questions you have with your health care provider.  Document Revised: 08/22/2019 Document Reviewed: 08/22/2019  [employer] Patient Education © 2021 [employer]

## 2021-12-07 NOTE — PROGRESS NOTES
"Subjective:  Azael Bray is a 66 y.o. male who presents for     Hypertension; at previous appointment was started on losartan, unfortunately, did not receive from pharmacy so has not been taking.  Blood pressure today is 112/68, additionally is taking carvedilol 25 mg twice daily, chlorthalidone 50 mg daily, hydralazine 50 mg 3 times daily, nifedipine 60 mg twice daily, spironolactone 100 mg daily.  Denied issues medication, denied headaches, chest pain, shortness of breath, vision changes, numbness, diarrhea, leg swelling, abdominal pain.    Diabetes; at previous appointment had Trulicity increased to 4.5 mg weekly.  Did not receive from the pharmacy, has been taking his 3 mg weekly dose and Lantus 60 units nightly.  A1c today was 9.0%, needs foot exam, eye exam.  Denied any issues medication, denied any polyphagia, polydipsia, polyuria.    There is no problem list on file for this patient.    Vitals:    Vitals:    12/07/21 0823   BP: 112/68   BP Location: Right arm   Patient Position: Sitting   Cuff Size: Large Adult   Pulse: 72   Temp: 97.5 °F (36.4 °C)   SpO2: 98%   Weight: (!) 140 kg (309 lb)   Height: 172.7 cm (68\")     Body mass index is 46.98 kg/m².      Current Outpatient Medications:   •  carvedilol (COREG) 25 MG tablet, Take 1 tablet by mouth 2 (Two) Times a Day With Meals., Disp: 180 tablet, Rfl: 1  •  chlorthalidone (HYGROTEN) 50 MG tablet, Take 50 mg by mouth Daily., Disp: , Rfl:   •  cholecalciferol (VITAMIN D3) 25 MCG (1000 UT) tablet, Take 1,250 Units by mouth Daily., Disp: , Rfl:   •  Dulaglutide 4.5 MG/0.5ML solution pen-injector, Inject 4.5 mg under the skin into the appropriate area as directed 1 (One) Time Per Week., Disp: 4 pen, Rfl: 1  •  gabapentin (NEURONTIN) 800 MG tablet, Take 1 tablet by mouth 3 (Three) Times a Day., Disp: 90 tablet, Rfl: 2  •  glucose blood test strip, Use daily and as need up to TID, Disp: 100 each, Rfl: 5  •  glucose monitor monitoring kit, Use daily and as " need up to TID, Disp: 1 each, Rfl: 0  •  hydrALAZINE (APRESOLINE) 50 MG tablet, Take 1 tablet by mouth 3 (Three) Times a Day., Disp: 180 tablet, Rfl: 2  •  insulin glargine (Lantus) 100 UNIT/ML injection, Inject 60 Units under the skin into the appropriate area as directed Every Morning Before Breakfast., Disp: , Rfl:   •  Insulin Syringes, Disposable, U-100 0.3 ML misc, 1 each Daily., Disp: , Rfl:   •  Lancets (freestyle) lancets, Use daily and as need up to TID, Disp: 100 each, Rfl: 5  •  NIFEdipine XL (PROCARDIA XL) 60 MG 24 hr tablet, Take 60 mg by mouth 2 (two) times a day., Disp: , Rfl:   •  Omega-3 Fatty Acids (fish oil) 1000 MG capsule capsule, Take 1,000 mg by mouth 2 (Two) Times a Day With Meals., Disp: , Rfl:   •  omeprazole (priLOSEC) 40 MG capsule, Take 1 capsule by mouth Daily., Disp: 90 capsule, Rfl: 1  •  potassium chloride ER (K-TAB) 20 MEQ tablet controlled-release ER tablet, Take 1 tablet by mouth 2 (Two) Times a Day., Disp: 120 tablet, Rfl: 1  •  [START ON 12/8/2021] rosuvastatin (CRESTOR) 40 MG tablet, Take 0.5 tablets by mouth 3 (Three) Times a Week., Disp: 90 tablet, Rfl: 1  •  sildenafil (Viagra) 100 MG tablet, Take 1 tablet by mouth Daily As Needed for Erectile Dysfunction., Disp: 15 tablet, Rfl: 5  •  spironolactone (ALDACTONE) 100 MG tablet, Take 1 tablet by mouth Daily., Disp: 90 tablet, Rfl: 1  •  vitamin B-12 (CYANOCOBALAMIN) 500 MCG tablet, Take 500 mcg by mouth Daily., Disp: , Rfl:     There is no problem list on file for this patient.    Past Surgical History:   Procedure Laterality Date   • APPENDECTOMY     • [facility]  [facility]   • HERNIA REPAIR Left      Social History     Socioeconomic History   • Marital status:    Tobacco Use   • Smoking status: Former Smoker   • Smokeless tobacco: Never Used   Vaping Use   • Vaping Use: Never used   Substance and Sexual Activity   • Alcohol use: Not Currently   • Drug use: Never   • Sexual activity: Not Currently     Family History    Problem Relation Age of Onset   • Diabetes Father    • Hypertension Father    • Heart disease Father    • Diabetes Paternal Grandfather      Office Visit on 10/26/2021   Component Date Value Ref Range Status   • Glucose 10/26/2021 150* 65 - 99 mg/dL Final   • BUN 10/26/2021 21  8 - 23 mg/dL Final   • Creatinine 10/26/2021 1.37* 0.76 - 1.27 mg/dL Final   • Sodium 10/26/2021 140  136 - 145 mmol/L Final   • Potassium 10/26/2021 3.3* 3.5 - 5.2 mmol/L Final   • Chloride 10/26/2021 98  98 - 107 mmol/L Final   • CO2 10/26/2021 31.7* 22.0 - 29.0 mmol/L Final   • Calcium 10/26/2021 9.6  8.6 - 10.5 mg/dL Final   • eGFR   Amer 10/26/2021 63  >60 mL/min/1.73 Final   • BUN/Creatinine Ratio 10/26/2021 15.3  7.0 - 25.0 Final   • Anion Gap 10/26/2021 10.3  5.0 - 15.0 mmol/L Final   • THC, Screen, Urine 10/26/2021 Negative  Negative Final   • Phencyclidine (PCP), Urine 10/26/2021 Negative  Negative Final   • Cocaine Screen, Urine 10/26/2021 Negative  Negative Final   • Methamphetamine, Ur 10/26/2021 Negative  Negative Final   • Opiate Screen 10/26/2021 Negative  Negative Final   • Amphetamine Screen, Urine 10/26/2021 Negative  Negative Final   • Benzodiazepine Screen, Urine 10/26/2021 Negative  Negative Final   • Tricyclic Antidepressants Screen 10/26/2021 Negative  Negative Final   • Methadone Screen, Urine 10/26/2021 Negative  Negative Final   • Barbiturates Screen, Urine 10/26/2021 Negative  Negative Final   • Oxycodone Screen, Urine 10/26/2021 Negative  Negative Final   • Propoxyphene Screen 10/26/2021 Negative  Negative Final   • Buprenorphine, Screen, Urine 10/26/2021 Negative  Negative Final   Office Visit on 09/17/2021   Component Date Value Ref Range Status   • Glucose 09/17/2021 99  65 - 99 mg/dL Final   • BUN 09/17/2021 23  8 - 23 mg/dL Final   • Creatinine 09/17/2021 1.09  0.76 - 1.27 mg/dL Final   • Sodium 09/17/2021 141  136 - 145 mmol/L Final   • Potassium 09/17/2021 3.1* 3.5 - 5.2 mmol/L Final   • Chloride  09/17/2021 97* 98 - 107 mmol/L Final   • CO2 09/17/2021 31.5* 22.0 - 29.0 mmol/L Final   • Calcium 09/17/2021 10.1  8.6 - 10.5 mg/dL Final   • eGFR   Amer 09/17/2021 82  >60 mL/min/1.73 Final   • BUN/Creatinine Ratio 09/17/2021 21.1  7.0 - 25.0 Final   • Anion Gap 09/17/2021 12.5  5.0 - 15.0 mmol/L Final   • Color, UA 09/17/2021 Yellow  Yellow, Straw Final   • Appearance, UA 09/17/2021 Clear  Clear Final   • pH, UA 09/17/2021 7.5  5.0 - 8.0 Final   • Specific Gravity, UA 09/17/2021 1.012  1.005 - 1.030 Final   • Glucose, UA 09/17/2021 Negative  Negative Final   • Ketones, UA 09/17/2021 Negative  Negative Final   • Bilirubin, UA 09/17/2021 Negative  Negative Final   • Blood, UA 09/17/2021 Trace* Negative Final   • Protein, UA 09/17/2021 30 mg/dL (1+)* Negative Final   • Leuk Esterase, UA 09/17/2021 Negative  Negative Final   • Nitrite, UA 09/17/2021 Negative  Negative Final   • Urobilinogen, UA 09/17/2021 0.2 E.U./dL  0.2 - 1.0 E.U./dL Final   • RBC, UA 09/17/2021 0-2  None Seen, 0-2 /HPF Final   • WBC, UA 09/17/2021 0-2  None Seen, 0-2 /HPF Final   • Bacteria, UA 09/17/2021 None Seen  None Seen /HPF Final   • Squamous Epithelial Cells, UA 09/17/2021 0-2  None Seen, 0-2 /HPF Final   • Hyaline Casts, UA 09/17/2021 None Seen  None Seen /LPF Final   • Methodology 09/17/2021 Automated Microscopy   Final   Office Visit on 08/24/2021   Component Date Value Ref Range Status   • Hemoglobin A1C 08/24/2021 9.9* % Final   • Lot Number 08/24/2021 [id number]   Final   • Expiration Date 08/24/2021 [id number]   Final   • Glucose 08/24/2021 159* 65 - 99 mg/dL Final   • BUN 08/24/2021 16  8 - 23 mg/dL Final   • Creatinine 08/24/2021 1.30* 0.76 - 1.27 mg/dL Final   • Sodium 08/24/2021 142  136 - 145 mmol/L Final   • Potassium 08/24/2021 2.9* 3.5 - 5.2 mmol/L Final   • Chloride 08/24/2021 96* 98 - 107 mmol/L Final   • CO2 08/24/2021 35.0* 22.0 - 29.0 mmol/L Final   • Calcium 08/24/2021 9.7  8.6 - 10.5 mg/dL Final   • Total Protein  08/24/2021 8.2  6.0 - 8.5 g/dL Final   • Albumin 08/24/2021 4.30  3.50 - 5.20 g/dL Final   • ALT (SGPT) 08/24/2021 22  1 - 41 U/L Final   • AST (SGOT) 08/24/2021 22  1 - 40 U/L Final   • Alkaline Phosphatase 08/24/2021 63  39 - 117 U/L Final   • Total Bilirubin 08/24/2021 0.6  0.0 - 1.2 mg/dL Final   • eGFR   Amer 08/24/2021 67  >60 mL/min/1.73 Final   • Globulin 08/24/2021 3.9  gm/dL Final   • A/G Ratio 08/24/2021 1.1  g/dL Final   • BUN/Creatinine Ratio 08/24/2021 12.3  7.0 - 25.0 Final   • Anion Gap 08/24/2021 11.0  5.0 - 15.0 mmol/L Final   • Total Cholesterol 08/24/2021 210* 0 - 200 mg/dL Final   • Triglycerides 08/24/2021 181* 0 - 150 mg/dL Final   • HDL Cholesterol 08/24/2021 41  40 - 60 mg/dL Final   • LDL Cholesterol  08/24/2021 136* 0 - 100 mg/dL Final   • VLDL Cholesterol 08/24/2021 33  5 - 40 mg/dL Final   • LDL/HDL Ratio 08/24/2021 3.24   Final   • Hepatitis C Ab 08/24/2021 0.4  0.0 - 0.9 s/co ratio Final   • TSH 08/24/2021 0.919  0.270 - 4.200 uIU/mL Final   • Microalbumin, Urine 08/24/2021 65.2  mg/dL Final   • WBC 08/24/2021 8.15  3.40 - 10.80 10*3/mm3 Final   • RBC 08/24/2021 6.56* 4.14 - 5.80 10*6/mm3 Final   • Hemoglobin 08/24/2021 16.6  13.0 - 17.7 g/dL Final   • Hematocrit 08/24/2021 52.0* 37.5 - 51.0 % Final   • MCV 08/24/2021 79.3  79.0 - 97.0 fL Final   • MCH 08/24/2021 25.3* 26.6 - 33.0 pg Final   • MCHC 08/24/2021 31.9  31.5 - 35.7 g/dL Final   • RDW 08/24/2021 16.8* 12.3 - 15.4 % Final   • RDW-SD 08/24/2021 41.6  37.0 - 54.0 fl Final   • MPV 08/24/2021 10.8  6.0 - 12.0 fL Final   • Platelets 08/24/2021 297  140 - 450 10*3/mm3 Final   • Neutrophil % 08/24/2021 55.9  42.7 - 76.0 % Final   • Lymphocyte % 08/24/2021 33.5  19.6 - 45.3 % Final   • Monocyte % 08/24/2021 8.8  5.0 - 12.0 % Final   • Eosinophil % 08/24/2021 1.0  0.3 - 6.2 % Final   • Basophil % 08/24/2021 0.6  0.0 - 1.5 % Final   • Immature Grans % 08/24/2021 0.2  0.0 - 0.5 % Final   • Neutrophils, Absolute 08/24/2021 4.55   1.70 - 7.00 10*3/mm3 Final   • Lymphocytes, Absolute 08/24/2021 2.73  0.70 - 3.10 10*3/mm3 Final   • Monocytes, Absolute 08/24/2021 0.72  0.10 - 0.90 10*3/mm3 Final   • Eosinophils, Absolute 08/24/2021 0.08  0.00 - 0.40 10*3/mm3 Final   • Basophils, Absolute 08/24/2021 0.05  0.00 - 0.20 10*3/mm3 Final   • Immature Grans, Absolute 08/24/2021 0.02  0.00 - 0.05 10*3/mm3 Final   • nRBC 08/24/2021 0.1  0.0 - 0.2 /100 WBC Final   • Interpretation 08/24/2021 Comment   Final    Negative  Not infected with HCV, unless recent infection is suspected or other  evidence exists to indicate HCV infection.      US Thyroid  Narrative: EXAM DESCRIPTION:  US THYROID 9/10/2021 2:00 PM CDT    [facility]: US THYROID     CLINICAL HISTORY:  65 years Male, left sided neck pain with swallowing., M54.2  Cervicalgia    COMPARISON:  None.    TECHNIQUE:  Real-time grayscale color Doppler images were obtained through  the thyroid gland.  37 images.    FINDINGS:  The right lobe of thyroid gland measures 1.9 x 2 x 5.8 cm.  The right lobe of thyroid gland is minimally heterogeneous.  Color Doppler imaging demonstrates flow to the right lobe of the  thyroid gland.  No focal lesions are perceived within the right lobe of thyroid  gland.    The left lobe of thyroid gland measures 2.2 x 2.6 x 5.5 cm.  The left lobe of thyroid gland is homogeneous.  Color Doppler imaging suggests flow to left lobe of thyroid  gland.    The thyroid isthmus is homogeneous and normal 7 mm.  A left neck lymph node is present within the region of concern.  This lymph node appears normal, and measures 6 x 11 x 13 mm.  Impression: Normal    Electronically signed by:  Higinio Franco MD  9/12/2021 9:38 PM CDT  Workstation: [id number]      @[email or web address]  Immunization History   Administered Date(s) Administered   • COVID-19 (MODERNA) 1st, 2nd, 3rd Dose Only 02/24/2021, 03/24/2021, 11/02/2021   • Fluzone High-Dose 65+yrs 09/30/2021   • Shingrix 12/07/2019, 06/07/2020   • Tdap 12/07/2019      The following portions of the patient's history were reviewed and updated as appropriate: allergies, current medications, past family history, past medical history, past social history, past surgical history and problem list.    PHQ-9 Total Score:         Lab 12/07/21  0840   HEMOGLOBIN A1C 9.0*         Physical Exam  Constitutional:       General: He is not in acute distress.  HENT:      Head: Normocephalic and atraumatic.   Cardiovascular:      Rate and Rhythm: Normal rate and regular rhythm.      Pulses:           Dorsalis pedis pulses are 2+ on the right side and 2+ on the left side.        Posterior tibial pulses are 2+ on the right side and 2+ on the left side.      Heart sounds: Normal heart sounds. No murmur heard.      Pulmonary:      Effort: Pulmonary effort is normal.      Breath sounds: Normal breath sounds.   Abdominal:      Palpations: Abdomen is soft.      Tenderness: There is no abdominal tenderness.   Musculoskeletal:         General: No swelling.      Right lower leg: No edema.      Left lower leg: No edema.      Right foot: Normal range of motion.      Left foot: Normal range of motion.   Feet:      Right foot:      Protective Sensation: 10 sites tested. 9 sites sensed.      Skin integrity: Skin integrity normal.      Toenail Condition: Right toenails are normal.      Left foot:      Protective Sensation: 10 sites tested. 9 sites sensed.      Skin integrity: Skin integrity normal.      Toenail Condition: Left toenails are normal.      Comments: Diabetic foot exam performed today with monofilament testing.  Skin:     Coloration: Skin is not jaundiced.      Findings: No rash.   Neurological:      Mental Status: He is alert and oriented to person, place, and time. Mental status is at baseline.   Psychiatric:         Mood and Affect: Mood normal.         Behavior: Behavior normal.         Assessment/Plan    Diagnosis Plan   1. Type 2 diabetes mellitus with hyperglycemia, with long-term current use  of insulin (HCC)  POCT glycated hemoglobin, total    Dulaglutide 4.5 MG/0.5ML solution pen-injector    Ambulatory Referral for Diabetic Eye Exam-Ophthalmology   2. Erectile dysfunction, unspecified erectile dysfunction type  sildenafil (Viagra) 100 MG tablet   3. Primary hypertension  spironolactone (ALDACTONE) 100 MG tablet    carvedilol (COREG) 25 MG tablet    hydrALAZINE (APRESOLINE) 50 MG tablet   4. Lipid screening     5. Hyperlipidemia, unspecified hyperlipidemia type  rosuvastatin (CRESTOR) 40 MG tablet   6. Gastroesophageal reflux disease, unspecified whether esophagitis present  omeprazole (priLOSEC) 40 MG capsule   7. Encounter for diabetic foot exam (HCC)        Orders Placed This Encounter   Procedures   • POCT glycated hemoglobin, total     Order Specific Question:   Release to patient     Answer:   Immediate     Type 2 diabetes; improved from previous, however still uncontrolled, A1c today was 9.0%, will increase Trulicity to 4.5 mg weekly, follow-up in 3 months, consider titrating insulin versus adding on empagliflozin. Discussed importance of glycemic control, patient voiced understanding, agreeable to plan. Foot exam performed today, refer for eye exam.    Hypertension; well-controlled today without addition of losartan, reassuring, continue to encourage lifestyle modifications, adherence to medications, patient voiced understanding, agreeable to plan.    Medication refill; as above, tolerating medications well, providing good relief, no adverse effects, will refill.              This document has been electronically signed by Rafat Lin MD on December 7, 2021 09:04 CST

## 2021-12-10 ENCOUNTER — TELEPHONE (OUTPATIENT)
Dept: FAMILY MEDICINE CLINIC | Facility: CLINIC | Age: 66
End: 2021-12-10

## 2021-12-10 NOTE — TELEPHONE ENCOUNTER
[facility] said [facility] needs to [facility] the [facility] something about the prescription for the Trulicity.. Calling to let you know that he did get that done he asked that you call him back

## 2021-12-13 NOTE — TELEPHONE ENCOUNTER
Pt stated that he had to get [facility] from the [facility] in order to get his trulicity. I told him to let us know if he had any issues getting his trulicity. He voiced understanding.

## 2021-12-27 NOTE — TELEPHONE ENCOUNTER
"Patient called back and said she heard back from \"Sabiha\" [facility] the [facility]. He said he was told he would not be given his medications until he saw the [facility] Dr at the [facility] but couldn't be seen until next month.  "

## 2021-12-27 NOTE — TELEPHONE ENCOUNTER
Pt called back and said that his [facility] Dr is retiring and that he needs these refills because Dr Yeboah ([facility] Dr) will no longer fill any medications for any of his patients.    [facility] and LVM for them to call me back about his medications.

## 2021-12-28 RX ORDER — MELATONIN
1000 DAILY
Qty: 90 TABLET | Refills: 1 | Status: SHIPPED | OUTPATIENT
Start: 2021-12-28 | End: 2022-01-05 | Stop reason: SDUPTHER

## 2021-12-28 RX ORDER — NIFEDIPINE 60 MG/1
60 TABLET, EXTENDED RELEASE ORAL DAILY
Qty: 90 TABLET | Refills: 1 | Status: SHIPPED | OUTPATIENT
Start: 2021-12-28 | End: 2022-01-05 | Stop reason: SDUPTHER

## 2021-12-28 RX ORDER — INSULIN GLARGINE 100 [IU]/ML
60 INJECTION, SOLUTION SUBCUTANEOUS
Qty: 18 ML | Refills: 1 | Status: SHIPPED | OUTPATIENT
Start: 2021-12-28 | End: 2022-01-05 | Stop reason: SDUPTHER

## 2021-12-28 RX ORDER — CHLORTHALIDONE 50 MG/1
50 TABLET ORAL DAILY
Qty: 90 TABLET | Refills: 1 | Status: SHIPPED | OUTPATIENT
Start: 2021-12-28 | End: 2022-01-05 | Stop reason: SDUPTHER

## 2021-12-28 RX ORDER — CHLORAL HYDRATE 500 MG
1000 CAPSULE ORAL 2 TIMES DAILY WITH MEALS
Qty: 90 CAPSULE | Refills: 1 | Status: SHIPPED | OUTPATIENT
Start: 2021-12-28 | End: 2022-01-05 | Stop reason: SDUPTHER

## 2021-12-28 RX ORDER — CHOLECALCIFEROL (VITAMIN D3) 125 MCG
500 CAPSULE ORAL DAILY
Qty: 90 TABLET | Refills: 1 | Status: SHIPPED | OUTPATIENT
Start: 2021-12-28 | End: 2022-01-05 | Stop reason: SDUPTHER

## 2022-01-05 DIAGNOSIS — E78.5 HYPERLIPIDEMIA, UNSPECIFIED HYPERLIPIDEMIA TYPE: ICD-10-CM

## 2022-01-05 DIAGNOSIS — E87.6 HYPOKALEMIA: ICD-10-CM

## 2022-01-05 DIAGNOSIS — Z79.4 TYPE 2 DIABETES MELLITUS WITH HYPERGLYCEMIA, WITH LONG-TERM CURRENT USE OF INSULIN: ICD-10-CM

## 2022-01-05 DIAGNOSIS — N52.9 ERECTILE DYSFUNCTION, UNSPECIFIED ERECTILE DYSFUNCTION TYPE: ICD-10-CM

## 2022-01-05 DIAGNOSIS — Z79.4 TYPE 2 DIABETES MELLITUS WITH DIABETIC NEUROPATHY, WITH LONG-TERM CURRENT USE OF INSULIN: ICD-10-CM

## 2022-01-05 DIAGNOSIS — K21.9 GASTROESOPHAGEAL REFLUX DISEASE, UNSPECIFIED WHETHER ESOPHAGITIS PRESENT: ICD-10-CM

## 2022-01-05 DIAGNOSIS — I10 PRIMARY HYPERTENSION: ICD-10-CM

## 2022-01-05 DIAGNOSIS — E11.65 TYPE 2 DIABETES MELLITUS WITH HYPERGLYCEMIA, WITH LONG-TERM CURRENT USE OF INSULIN: ICD-10-CM

## 2022-01-05 DIAGNOSIS — E11.40 TYPE 2 DIABETES MELLITUS WITH DIABETIC NEUROPATHY, WITH LONG-TERM CURRENT USE OF INSULIN: ICD-10-CM

## 2022-01-05 NOTE — TELEPHONE ENCOUNTER
Patient called asking to speak to nurse about medication. Asked if it was pertaining to refills and he answered no, it's much more complex than that. Let patient know a message would be sent and he would receive a return call when possible and patient requested nurse leave a message if he doesn't answer so he knows who called.

## 2022-01-05 NOTE — TELEPHONE ENCOUNTER
Called pt back. He said he still hasn't gotten his Trulicity. He called the pharmacy and they said they do not even have Spironolactone or hydralazine on his medication list. He was also told he can get a refill of chlorthalidone , nefedipine and carvedilol but he said he hasn't even received any yet. He gave me the [facility] to [facility] so I can talk with Dr Lemus, the pharmacist.    [phone]    Called clinic and spoke with Dr Lemus. He said that pt just has to call the pharmacy and request the refill. He will then need to go and pick it up at the pharmacy.     Called pt back and informed him of what I said. Apparently, he needs his meds to go to the [facility] pharmacy in [place]. 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     [facility] and was told that they cannot get any of his scripts from the [facility] pharmacy. They will all have to be sent again.  Dr Lemus called me back and said that the scripts will have to be [facility] the [facility] to Sabiha Rueda and ask if Dr Hancock will re-write the scripts. They have to come from a [facility] approved Dr in order to be covered. Pt is coming here because he doesn't like the [facility] doctors, therefore, this is not [facility] approved.

## 2022-01-07 RX ORDER — SILDENAFIL 100 MG/1
100 TABLET, FILM COATED ORAL DAILY PRN
Qty: 15 TABLET | Refills: 5 | Status: SHIPPED | OUTPATIENT
Start: 2022-01-07 | End: 2022-03-07 | Stop reason: SDUPTHER

## 2022-01-07 RX ORDER — MELATONIN
1000 DAILY
Qty: 90 TABLET | Refills: 1 | Status: SHIPPED | OUTPATIENT
Start: 2022-01-07 | End: 2022-05-09 | Stop reason: SDUPTHER

## 2022-01-07 RX ORDER — POTASSIUM CHLORIDE 1500 MG/1
20 TABLET, FILM COATED, EXTENDED RELEASE ORAL 2 TIMES DAILY
Qty: 120 TABLET | Refills: 1 | Status: SHIPPED | OUTPATIENT
Start: 2022-01-07 | End: 2022-03-07 | Stop reason: SDUPTHER

## 2022-01-07 RX ORDER — CHLORTHALIDONE 50 MG/1
50 TABLET ORAL DAILY
Qty: 90 TABLET | Refills: 1 | Status: SHIPPED | OUTPATIENT
Start: 2022-01-07 | End: 2022-03-07 | Stop reason: SDUPTHER

## 2022-01-07 RX ORDER — INSULIN GLARGINE 100 [IU]/ML
60 INJECTION, SOLUTION SUBCUTANEOUS
Qty: 18 ML | Refills: 1 | Status: SHIPPED | OUTPATIENT
Start: 2022-01-07 | End: 2022-06-24 | Stop reason: SDUPTHER

## 2022-01-07 RX ORDER — CHLORAL HYDRATE 500 MG
1000 CAPSULE ORAL 2 TIMES DAILY WITH MEALS
Qty: 90 CAPSULE | Refills: 1 | Status: SHIPPED | OUTPATIENT
Start: 2022-01-07 | End: 2022-10-27

## 2022-01-07 RX ORDER — CARVEDILOL 25 MG/1
25 TABLET ORAL 2 TIMES DAILY WITH MEALS
Qty: 180 TABLET | Refills: 1 | Status: SHIPPED | OUTPATIENT
Start: 2022-01-07 | End: 2022-06-24 | Stop reason: SDUPTHER

## 2022-01-07 RX ORDER — OMEPRAZOLE 40 MG/1
40 CAPSULE, DELAYED RELEASE ORAL DAILY
Qty: 90 CAPSULE | Refills: 1 | Status: SHIPPED | OUTPATIENT
Start: 2022-01-07 | End: 2022-07-26 | Stop reason: SDUPTHER

## 2022-01-07 RX ORDER — ROSUVASTATIN CALCIUM 40 MG/1
20 TABLET, COATED ORAL 3 TIMES WEEKLY
Qty: 90 TABLET | Refills: 1 | Status: SHIPPED | OUTPATIENT
Start: 2022-01-07 | End: 2022-03-07 | Stop reason: SDUPTHER

## 2022-01-07 RX ORDER — NIFEDIPINE 60 MG/1
60 TABLET, EXTENDED RELEASE ORAL DAILY
Qty: 90 TABLET | Refills: 1 | Status: SHIPPED | OUTPATIENT
Start: 2022-01-07 | End: 2022-06-24 | Stop reason: SDUPTHER

## 2022-01-07 RX ORDER — SPIRONOLACTONE 100 MG/1
100 TABLET, FILM COATED ORAL DAILY
Qty: 90 TABLET | Refills: 1 | Status: SHIPPED | OUTPATIENT
Start: 2022-01-07 | End: 2022-03-07 | Stop reason: SDUPTHER

## 2022-01-07 RX ORDER — LANCETS 28 GAUGE
EACH MISCELLANEOUS
Qty: 100 EACH | Refills: 5 | Status: SHIPPED | OUTPATIENT
Start: 2022-01-07

## 2022-01-07 RX ORDER — HYDRALAZINE HYDROCHLORIDE 50 MG/1
50 TABLET, FILM COATED ORAL 3 TIMES DAILY
Qty: 180 TABLET | Refills: 2 | Status: SHIPPED | OUTPATIENT
Start: 2022-01-07 | End: 2022-06-24 | Stop reason: SDUPTHER

## 2022-01-07 RX ORDER — CHOLECALCIFEROL (VITAMIN D3) 125 MCG
500 CAPSULE ORAL DAILY
Qty: 90 TABLET | Refills: 1 | Status: SHIPPED | OUTPATIENT
Start: 2022-01-07 | End: 2023-01-27 | Stop reason: SDUPTHER

## 2022-01-20 NOTE — TELEPHONE ENCOUNTER
Faxed over signed orders.      Called pt to let him know what all was going on. He said that he tested positive for covid.

## 2022-01-21 ENCOUNTER — TELEPHONE (OUTPATIENT)
Dept: FAMILY MEDICINE CLINIC | Facility: CLINIC | Age: 67
End: 2022-01-21

## 2022-01-21 NOTE — TELEPHONE ENCOUNTER
Patient called and asked to speak with me but I wan unable to get to my desk to speak with him.    Called him back and he wanted to know about the scripts sent in if we sent in all of them. I told him which ones we sent in. He voiced understanding.

## 2022-02-02 ENCOUNTER — TELEPHONE (OUTPATIENT)
Dept: FAMILY MEDICINE CLINIC | Facility: CLINIC | Age: 67
End: 2022-02-02

## 2022-02-02 NOTE — TELEPHONE ENCOUNTER
Patient called stating he would like to speak to Haley in regards to his medication refills. Let him know she was temporarily out of office at the time, but someone would get back to him. Let him know that his medication was re-ordered to [place] pharmacy. He acknowledged understanding, thanked me, and gave safe wishes with potential weather.

## 2022-02-14 ENCOUNTER — TELEPHONE (OUTPATIENT)
Dept: FAMILY MEDICINE CLINIC | Facility: CLINIC | Age: 67
End: 2022-02-14

## 2022-02-14 NOTE — TELEPHONE ENCOUNTER
Spoke to pt and he stated that medication was sent to [facility] and should have been sent to [facility]. Informed pt that all medications dated 1/7 have [facility] pharmacy on them. He stated he has not received them. I asked had he contacted the pharmacy and he said no. I suggested he contact them and make sure they received them and where they were in the process of getting them to him. He voiced understanding and stated he would.

## 2022-02-14 NOTE — TELEPHONE ENCOUNTER
Patient would like a call back from Haley HENDRICKSON MA in regards to his medication.  Call back number: [phone]

## 2022-02-16 ENCOUNTER — TELEPHONE (OUTPATIENT)
Dept: FAMILY MEDICINE CLINIC | Facility: CLINIC | Age: 67
End: 2022-02-16

## 2022-02-16 NOTE — TELEPHONE ENCOUNTER
[facility] called he stated the [facility] [place] is needing a copy of his medications. You can call their office at [phone]

## 2022-03-04 ENCOUNTER — TELEPHONE (OUTPATIENT)
Dept: FAMILY MEDICINE CLINIC | Facility: CLINIC | Age: 67
End: 2022-03-04

## 2022-03-07 ENCOUNTER — OFFICE VISIT (OUTPATIENT)
Dept: FAMILY MEDICINE CLINIC | Facility: CLINIC | Age: 67
End: 2022-03-07

## 2022-03-07 VITALS
BODY MASS INDEX: 48.85 KG/M2 | DIASTOLIC BLOOD PRESSURE: 84 MMHG | RESPIRATION RATE: 20 BRPM | WEIGHT: 311.25 LBS | SYSTOLIC BLOOD PRESSURE: 166 MMHG | HEART RATE: 77 BPM | OXYGEN SATURATION: 97 % | TEMPERATURE: 97.3 F | HEIGHT: 67 IN

## 2022-03-07 DIAGNOSIS — I10 PRIMARY HYPERTENSION: ICD-10-CM

## 2022-03-07 DIAGNOSIS — E11.65 TYPE 2 DIABETES MELLITUS WITH HYPERGLYCEMIA, WITH LONG-TERM CURRENT USE OF INSULIN: Primary | ICD-10-CM

## 2022-03-07 DIAGNOSIS — N52.9 ERECTILE DYSFUNCTION, UNSPECIFIED ERECTILE DYSFUNCTION TYPE: ICD-10-CM

## 2022-03-07 DIAGNOSIS — E78.5 HYPERLIPIDEMIA, UNSPECIFIED HYPERLIPIDEMIA TYPE: ICD-10-CM

## 2022-03-07 DIAGNOSIS — Z79.4 TYPE 2 DIABETES MELLITUS WITH HYPERGLYCEMIA, WITH LONG-TERM CURRENT USE OF INSULIN: Primary | ICD-10-CM

## 2022-03-07 DIAGNOSIS — E87.6 HYPOKALEMIA: ICD-10-CM

## 2022-03-07 LAB — HBA1C MFR BLD: 8.5 %

## 2022-03-07 PROCEDURE — 83036 HEMOGLOBIN GLYCOSYLATED A1C: CPT | Performed by: STUDENT IN AN ORGANIZED HEALTH CARE EDUCATION/TRAINING PROGRAM

## 2022-03-07 PROCEDURE — 99214 OFFICE O/P EST MOD 30 MIN: CPT | Performed by: STUDENT IN AN ORGANIZED HEALTH CARE EDUCATION/TRAINING PROGRAM

## 2022-03-07 PROCEDURE — 3052F HG A1C>EQUAL 8.0%<EQUAL 9.0%: CPT | Performed by: STUDENT IN AN ORGANIZED HEALTH CARE EDUCATION/TRAINING PROGRAM

## 2022-03-07 RX ORDER — ROSUVASTATIN CALCIUM 40 MG/1
20 TABLET, COATED ORAL 3 TIMES WEEKLY
Qty: 90 TABLET | Refills: 1 | Status: SHIPPED | OUTPATIENT
Start: 2022-03-07 | End: 2022-04-06 | Stop reason: SDUPTHER

## 2022-03-07 RX ORDER — CHLORTHALIDONE 50 MG/1
50 TABLET ORAL DAILY
Qty: 90 TABLET | Refills: 1 | Status: SHIPPED | OUTPATIENT
Start: 2022-03-07 | End: 2022-04-06 | Stop reason: SDUPTHER

## 2022-03-07 RX ORDER — POTASSIUM CHLORIDE 1500 MG/1
20 TABLET, FILM COATED, EXTENDED RELEASE ORAL 2 TIMES DAILY
Qty: 120 TABLET | Refills: 1 | Status: SHIPPED | OUTPATIENT
Start: 2022-03-07 | End: 2022-04-06 | Stop reason: SDUPTHER

## 2022-03-07 RX ORDER — SPIRONOLACTONE 100 MG/1
100 TABLET, FILM COATED ORAL DAILY
Qty: 90 TABLET | Refills: 1 | Status: SHIPPED | OUTPATIENT
Start: 2022-03-07 | End: 2022-04-06 | Stop reason: SDUPTHER

## 2022-03-07 RX ORDER — SILDENAFIL 100 MG/1
100 TABLET, FILM COATED ORAL DAILY PRN
Qty: 15 TABLET | Refills: 5 | Status: SHIPPED | OUTPATIENT
Start: 2022-03-07 | End: 2022-04-06 | Stop reason: SDUPTHER

## 2022-03-07 RX ORDER — LOSARTAN POTASSIUM 50 MG/1
50 TABLET ORAL DAILY
Qty: 90 TABLET | Refills: 1 | Status: SHIPPED | OUTPATIENT
Start: 2022-03-07 | End: 2022-04-06 | Stop reason: SDUPTHER

## 2022-03-07 NOTE — PROGRESS NOTES
"Subjective:  Azael Bray is a 66 y.o. male who presents for     Type 2 diabetes; currently on Trulicity 4.5 mg weekly, Lantus 60 units nightly.  At last appointment had Trulicity increased to 4.5 mg weekly, states tolerate medication well, no adverse effects, nausea, vomiting or abdominal pain.  Did have an issue with pharmacy, was unable to get medication for several weeks.  Blood glucose at home has been slightly elevated.  No acute complaints today.    Hypertension; currently on chlorthalidone 50 g daily, carvedilol 25 mg twice daily, hydralazine 50 mg 3 times daily, nifedipine 60 mg daily, losartan 25 mg daily denied issues medication, states blood pressure has been well controlled at home.  No chest pain, shortness of breath, headaches, vision changes, numbness, tingling, weakness.    There is no problem list on file for this patient.    Vitals:    Vitals:    03/07/22 0831   BP: 166/84   BP Location: Right arm   Patient Position: Sitting   Cuff Size: Large Adult   Pulse: 77   Resp: 20   Temp: 97.3 °F (36.3 °C)   TempSrc: Temporal   SpO2: 97%   Weight: (!) 141 kg (311 lb 4 oz)   Height: 170.2 cm (67\")     Body mass index is 48.75 kg/m².      Current Outpatient Medications:   •  carvedilol (COREG) 25 MG tablet, Take 1 tablet by mouth 2 (Two) Times a Day With Meals., Disp: 180 tablet, Rfl: 1  •  chlorthalidone (HYGROTEN) 50 MG tablet, Take 1 tablet by mouth Daily., Disp: 90 tablet, Rfl: 1  •  cholecalciferol (VITAMIN D3) 25 MCG (1000 UT) tablet, Take 1 tablet by mouth Daily., Disp: 90 tablet, Rfl: 1  •  Dulaglutide 4.5 MG/0.5ML solution pen-injector, Inject 4.5 mg under the skin into the appropriate area as directed 1 (One) Time Per Week., Disp: 4 pen, Rfl: 1  •  gabapentin (NEURONTIN) 800 MG tablet, Take 1 tablet by mouth 3 (Three) Times a Day., Disp: 90 tablet, Rfl: 2  •  glucose blood test strip, Use daily and as need up to TID, Disp: 100 each, Rfl: 5  •  hydrALAZINE (APRESOLINE) 50 MG tablet, Take 1 " tablet by mouth 3 (Three) Times a Day., Disp: 180 tablet, Rfl: 2  •  insulin glargine (Lantus) 100 UNIT/ML injection, Inject 60 Units under the skin into the appropriate area as directed Every Morning Before Breakfast., Disp: 18 mL, Rfl: 1  •  NIFEdipine XL (PROCARDIA XL) 60 MG 24 hr tablet, Take 1 tablet by mouth Daily., Disp: 90 tablet, Rfl: 1  •  Omega-3 Fatty Acids (fish oil) 1000 MG capsule capsule, Take 1 capsule by mouth 2 (Two) Times a Day With Meals., Disp: 90 capsule, Rfl: 1  •  omeprazole (priLOSEC) 40 MG capsule, Take 1 capsule by mouth Daily., Disp: 90 capsule, Rfl: 1  •  potassium chloride ER (K-TAB) 20 MEQ tablet controlled-release ER tablet, Take 1 tablet by mouth 2 (Two) Times a Day., Disp: 120 tablet, Rfl: 1  •  rosuvastatin (CRESTOR) 40 MG tablet, Take 0.5 tablets by mouth 3 (Three) Times a Week., Disp: 90 tablet, Rfl: 1  •  sildenafil (Viagra) 100 MG tablet, Take 1 tablet by mouth Daily As Needed for Erectile Dysfunction., Disp: 15 tablet, Rfl: 5  •  spironolactone (ALDACTONE) 100 MG tablet, Take 1 tablet by mouth Daily., Disp: 90 tablet, Rfl: 1  •  vitamin B-12 (CYANOCOBALAMIN) 500 MCG tablet, Take 1 tablet by mouth Daily., Disp: 90 tablet, Rfl: 1  •  glucose monitor monitoring kit, Use daily and as need up to TID, Disp: 1 each, Rfl: 0  •  Insulin Syringes, Disposable, U-100 0.3 ML misc, 1 each Daily., Disp: 100 each, Rfl: 1  •  Lancets (freestyle) lancets, Use daily and as need up to TID, Disp: 100 each, Rfl: 5  •  losartan (Cozaar) 50 MG tablet, Take 1 tablet by mouth Daily., Disp: 90 tablet, Rfl: 1    There is no problem list on file for this patient.    Past Surgical History:   Procedure Laterality Date   • APPENDECTOMY     • [facility]  [facility]   • HERNIA REPAIR Left      Social History     Socioeconomic History   • Marital status:    Tobacco Use   • Smoking status: Former Smoker   • Smokeless tobacco: Never Used   Vaping Use   • Vaping Use: Never used   Substance and Sexual  Activity   • Alcohol use: Not Currently   • Drug use: Never   • Sexual activity: Not Currently     Family History   Problem Relation Age of Onset   • Diabetes Father    • Hypertension Father    • Heart disease Father    • Diabetes Paternal Grandfather      Office Visit on 12/07/2021   Component Date Value Ref Range Status   • Hemoglobin A1C 12/07/2021 9.0 (A) % Final   • Lot Number 12/07/2021 [id number]   Final   • Expiration Date 12/07/2021 [id number]   Final   Office Visit on 10/26/2021   Component Date Value Ref Range Status   • Glucose 10/26/2021 150 (A) 65 - 99 mg/dL Final   • BUN 10/26/2021 21  8 - 23 mg/dL Final   • Creatinine 10/26/2021 1.37 (A) 0.76 - 1.27 mg/dL Final   • Sodium 10/26/2021 140  136 - 145 mmol/L Final   • Potassium 10/26/2021 3.3 (A) 3.5 - 5.2 mmol/L Final   • Chloride 10/26/2021 98  98 - 107 mmol/L Final   • CO2 10/26/2021 31.7 (A) 22.0 - 29.0 mmol/L Final   • Calcium 10/26/2021 9.6  8.6 - 10.5 mg/dL Final   • eGFR   Amer 10/26/2021 63  >60 mL/min/1.73 Final   • BUN/Creatinine Ratio 10/26/2021 15.3  7.0 - 25.0 Final   • Anion Gap 10/26/2021 10.3  5.0 - 15.0 mmol/L Final   • THC, Screen, Urine 10/26/2021 Negative  Negative Final   • Phencyclidine (PCP), Urine 10/26/2021 Negative  Negative Final   • Cocaine Screen, Urine 10/26/2021 Negative  Negative Final   • Methamphetamine, Ur 10/26/2021 Negative  Negative Final   • Opiate Screen 10/26/2021 Negative  Negative Final   • Amphetamine Screen, Urine 10/26/2021 Negative  Negative Final   • Benzodiazepine Screen, Urine 10/26/2021 Negative  Negative Final   • Tricyclic Antidepressants Screen 10/26/2021 Negative  Negative Final   • Methadone Screen, Urine 10/26/2021 Negative  Negative Final   • Barbiturates Screen, Urine 10/26/2021 Negative  Negative Final   • Oxycodone Screen, Urine 10/26/2021 Negative  Negative Final   • Propoxyphene Screen 10/26/2021 Negative  Negative Final   • Buprenorphine, Screen, Urine 10/26/2021 Negative  Negative Final    Office Visit on 09/17/2021   Component Date Value Ref Range Status   • Glucose 09/17/2021 99  65 - 99 mg/dL Final   • BUN 09/17/2021 23  8 - 23 mg/dL Final   • Creatinine 09/17/2021 1.09  0.76 - 1.27 mg/dL Final   • Sodium 09/17/2021 141  136 - 145 mmol/L Final   • Potassium 09/17/2021 3.1 (A) 3.5 - 5.2 mmol/L Final   • Chloride 09/17/2021 97 (A) 98 - 107 mmol/L Final   • CO2 09/17/2021 31.5 (A) 22.0 - 29.0 mmol/L Final   • Calcium 09/17/2021 10.1  8.6 - 10.5 mg/dL Final   • eGFR   Amer 09/17/2021 82  >60 mL/min/1.73 Final   • BUN/Creatinine Ratio 09/17/2021 21.1  7.0 - 25.0 Final   • Anion Gap 09/17/2021 12.5  5.0 - 15.0 mmol/L Final   • Color, UA 09/17/2021 Yellow  Yellow, Straw Final   • Appearance, UA 09/17/2021 Clear  Clear Final   • pH, UA 09/17/2021 7.5  5.0 - 8.0 Final   • Specific Gravity, UA 09/17/2021 1.012  1.005 - 1.030 Final   • Glucose, UA 09/17/2021 Negative  Negative Final   • Ketones, UA 09/17/2021 Negative  Negative Final   • Bilirubin, UA 09/17/2021 Negative  Negative Final   • Blood, UA 09/17/2021 Trace (A) Negative Final   • Protein, UA 09/17/2021 30 mg/dL (1+) (A) Negative Final   • Leuk Esterase, UA 09/17/2021 Negative  Negative Final   • Nitrite, UA 09/17/2021 Negative  Negative Final   • Urobilinogen, UA 09/17/2021 0.2 E.U./dL  0.2 - 1.0 E.U./dL Final   • RBC, UA 09/17/2021 0-2  None Seen, 0-2 /HPF Final   • WBC, UA 09/17/2021 0-2  None Seen, 0-2 /HPF Final   • Bacteria, UA 09/17/2021 None Seen  None Seen /HPF Final   • Squamous Epithelial Cells, UA 09/17/2021 0-2  None Seen, 0-2 /HPF Final   • Hyaline Casts, UA 09/17/2021 None Seen  None Seen /LPF Final   • Methodology 09/17/2021 Automated Microscopy   Final      US Thyroid  Narrative: EXAM DESCRIPTION:  US THYROID 9/10/2021 2:00 PM CDT    [facility]: US THYROID     CLINICAL HISTORY:  65 years Male, left sided neck pain with swallowing., M54.2  Cervicalgia    COMPARISON:  None.    TECHNIQUE:  Real-time grayscale color Doppler images  were obtained through  the thyroid gland.  37 images.    FINDINGS:  The right lobe of thyroid gland measures 1.9 x 2 x 5.8 cm.  The right lobe of thyroid gland is minimally heterogeneous.  Color Doppler imaging demonstrates flow to the right lobe of the  thyroid gland.  No focal lesions are perceived within the right lobe of thyroid  gland.    The left lobe of thyroid gland measures 2.2 x 2.6 x 5.5 cm.  The left lobe of thyroid gland is homogeneous.  Color Doppler imaging suggests flow to left lobe of thyroid  gland.    The thyroid isthmus is homogeneous and normal 7 mm.  A left neck lymph node is present within the region of concern.  This lymph node appears normal, and measures 6 x 11 x 13 mm.  Impression: Normal    Electronically signed by:  Higinio Franco MD  9/12/2021 9:38 PM CDT  Workstation: [id number]      [email or web address]  Immunization History   Administered Date(s) Administered   • COVID-19 (MODERNA) 1st, 2nd, 3rd Dose Only 02/24/2021, 03/24/2021, 11/02/2021   • Fluzone High-Dose 65+yrs 09/30/2021   • Shingrix 12/07/2019, 06/07/2020   • Tdap 12/07/2019     The following portions of the patient's history were reviewed and updated as appropriate: allergies, current medications, past family history, past medical history, past social history, past surgical history and problem list.    PHQ-9 Total Score:         Lab 03/07/22  0923   HEMOGLOBIN A1C 8.5       Physical Exam  Constitutional:       General: He is not in acute distress.  HENT:      Head: Normocephalic and atraumatic.   Cardiovascular:      Rate and Rhythm: Normal rate and regular rhythm.      Heart sounds: Normal heart sounds. No murmur heard.  Pulmonary:      Effort: Pulmonary effort is normal.      Breath sounds: Normal breath sounds.   Abdominal:      Palpations: Abdomen is soft.      Tenderness: There is no abdominal tenderness.   Musculoskeletal:         General: No swelling.      Right lower leg: No edema.      Left lower leg: No edema.   Skin:      Coloration: Skin is not jaundiced.      Findings: No rash.   Neurological:      Mental Status: He is alert and oriented to person, place, and time. Mental status is at baseline.   Psychiatric:         Mood and Affect: Mood normal.         Behavior: Behavior normal.       Assessment/Plan    Diagnosis Plan   1. Type 2 diabetes mellitus with hyperglycemia, with long-term current use of insulin (HCC)  POC Glycated Hemoglobin, Total    Dulaglutide 4.5 MG/0.5ML solution pen-injector    glucose blood test strip   2. Primary hypertension  chlorthalidone (HYGROTEN) 50 MG tablet    spironolactone (ALDACTONE) 100 MG tablet    losartan (Cozaar) 50 MG tablet   3. Hypokalemia  potassium chloride ER (K-TAB) 20 MEQ tablet controlled-release ER tablet   4. Hyperlipidemia, unspecified hyperlipidemia type  rosuvastatin (CRESTOR) 40 MG tablet   5. Erectile dysfunction, unspecified erectile dysfunction type  sildenafil (Viagra) 100 MG tablet      Orders Placed This Encounter   Procedures   • POC Glycated Hemoglobin, Total     Order Specific Question:   Release to patient     Answer:   Immediate     Diabetes; uncontrolled but improved from previous, did have issue with medications due to pharmacy availability, continue to encourage diet, exercise, weight loss, glycemic control.  Patient voiced understanding, follow-up in 1 month, sooner if needed.    Hypertension; uncontrolled, will increase losartan to 50 mg daily, continue manage medications, no issues medications, reassuring.  No red flags today.  Reassuring.          This document has been electronically signed by Rafat Lin MD on March 7, 2022 10:03 [facility]    EMR Dragon/Transciption Disclaimer: Some of this note may be an electronic transcription/translation of spoken language to printed text.  The electronic translation of spoken language may permit erroneous, or at times, nonsensical words or phrases to be inadvertently transcribed. Although I have reviewed the note for such  errors, some may still exist.

## 2022-03-21 ENCOUNTER — LAB (OUTPATIENT)
Dept: LAB | Facility: HOSPITAL | Age: 67
End: 2022-03-21

## 2022-03-21 LAB
ANION GAP SERPL CALCULATED.3IONS-SCNC: 13.1 MMOL/L (ref 5–15)
BUN SERPL-MCNC: 13 MG/DL (ref 8–23)
BUN/CREAT SERPL: 10.8 (ref 7–25)
CALCIUM SPEC-SCNC: 9.4 MG/DL (ref 8.6–10.5)
CHLORIDE SERPL-SCNC: 101 MMOL/L (ref 98–107)
CO2 SERPL-SCNC: 28.9 MMOL/L (ref 22–29)
CREAT SERPL-MCNC: 1.2 MG/DL (ref 0.76–1.27)
EGFRCR SERPLBLD CKD-EPI 2021: 66.3 ML/MIN/1.73
GLUCOSE SERPL-MCNC: 91 MG/DL (ref 65–99)
POTASSIUM SERPL-SCNC: 3.3 MMOL/L (ref 3.5–5.2)
SODIUM SERPL-SCNC: 143 MMOL/L (ref 136–145)

## 2022-03-21 PROCEDURE — 80048 BASIC METABOLIC PNL TOTAL CA: CPT | Performed by: STUDENT IN AN ORGANIZED HEALTH CARE EDUCATION/TRAINING PROGRAM

## 2022-03-27 DIAGNOSIS — E87.6 HYPOKALEMIA: Primary | ICD-10-CM

## 2022-03-27 RX ORDER — POTASSIUM CHLORIDE 20 MEQ/1
20 TABLET, EXTENDED RELEASE ORAL 2 TIMES DAILY
Qty: 90 TABLET | Refills: 1 | Status: SHIPPED | OUTPATIENT
Start: 2022-03-27 | End: 2022-04-12 | Stop reason: SDUPTHER

## 2022-04-04 ENCOUNTER — TELEPHONE (OUTPATIENT)
Dept: FAMILY MEDICINE CLINIC | Facility: CLINIC | Age: 67
End: 2022-04-04

## 2022-04-04 NOTE — TELEPHONE ENCOUNTER
Called pt back and they are needing a verbal for the medications that were faxed over on 3/7/22. The number to call is [phone]. They close at 4:30 so I will have to call them tomorrow. Pt voiced understanding.

## 2022-04-04 NOTE — TELEPHONE ENCOUNTER
Patient called stated he needs to speak to the nurse about some medications. Please give him a call

## 2022-04-05 NOTE — TELEPHONE ENCOUNTER
Called pharmacy and was told that they have [facility]. They said they have sent it twice with no response. I was told that we will have to send it to a different pharmacy for him to fill it or he can talk with [facility] about getting [facility] referral to be seen by our clinic. [facility] will have to contact [facility] about that.    Called pt and made him aware. He voiced understanding.

## 2022-04-06 DIAGNOSIS — I10 PRIMARY HYPERTENSION: ICD-10-CM

## 2022-04-06 DIAGNOSIS — E87.6 HYPOKALEMIA: ICD-10-CM

## 2022-04-06 DIAGNOSIS — Z79.4 TYPE 2 DIABETES MELLITUS WITH HYPERGLYCEMIA, WITH LONG-TERM CURRENT USE OF INSULIN: ICD-10-CM

## 2022-04-06 DIAGNOSIS — E78.5 HYPERLIPIDEMIA, UNSPECIFIED HYPERLIPIDEMIA TYPE: ICD-10-CM

## 2022-04-06 DIAGNOSIS — E11.65 TYPE 2 DIABETES MELLITUS WITH HYPERGLYCEMIA, WITH LONG-TERM CURRENT USE OF INSULIN: ICD-10-CM

## 2022-04-06 DIAGNOSIS — N52.9 ERECTILE DYSFUNCTION, UNSPECIFIED ERECTILE DYSFUNCTION TYPE: ICD-10-CM

## 2022-04-06 RX ORDER — SPIRONOLACTONE 100 MG/1
100 TABLET, FILM COATED ORAL DAILY
Qty: 90 TABLET | Refills: 1 | Status: SHIPPED | OUTPATIENT
Start: 2022-04-06 | End: 2022-06-24 | Stop reason: SDUPTHER

## 2022-04-06 RX ORDER — ROSUVASTATIN CALCIUM 40 MG/1
20 TABLET, COATED ORAL 3 TIMES WEEKLY
Qty: 90 TABLET | Refills: 1 | Status: SHIPPED | OUTPATIENT
Start: 2022-04-06 | End: 2022-06-24 | Stop reason: SDUPTHER

## 2022-04-06 RX ORDER — SILDENAFIL 100 MG/1
100 TABLET, FILM COATED ORAL DAILY PRN
Qty: 15 TABLET | Refills: 5 | Status: SHIPPED | OUTPATIENT
Start: 2022-04-06 | End: 2023-01-27 | Stop reason: SDUPTHER

## 2022-04-06 RX ORDER — CHLORTHALIDONE 50 MG/1
50 TABLET ORAL DAILY
Qty: 90 TABLET | Refills: 1 | Status: SHIPPED | OUTPATIENT
Start: 2022-04-06 | End: 2022-06-24 | Stop reason: SDUPTHER

## 2022-04-06 RX ORDER — LOSARTAN POTASSIUM 50 MG/1
50 TABLET ORAL DAILY
Qty: 90 TABLET | Refills: 1 | Status: SHIPPED | OUTPATIENT
Start: 2022-04-06 | End: 2022-06-24 | Stop reason: SDUPTHER

## 2022-04-06 RX ORDER — POTASSIUM CHLORIDE 1500 MG/1
20 TABLET, FILM COATED, EXTENDED RELEASE ORAL 2 TIMES DAILY
Qty: 120 TABLET | Refills: 1 | Status: SHIPPED | OUTPATIENT
Start: 2022-04-06 | End: 2022-06-24 | Stop reason: SDUPTHER

## 2022-04-06 NOTE — TELEPHONE ENCOUNTER
Rx Refill Note  Requested Prescriptions     Pending Prescriptions Disp Refills   • chlorthalidone (HYGROTEN) 50 MG tablet 90 tablet 1     Sig: Take 1 tablet by mouth Daily.   • spironolactone (ALDACTONE) 100 MG tablet 90 tablet 1     Sig: Take 1 tablet by mouth Daily.   • rosuvastatin (CRESTOR) 40 MG tablet 90 tablet 1     Sig: Take 0.5 tablets by mouth 3 (Three) Times a Week.   • glucose blood test strip 100 each 5     Sig: Use daily and as need up to TID   • losartan (Cozaar) 50 MG tablet 90 tablet 1     Sig: Take 1 tablet by mouth Daily.   • sildenafil (Viagra) 100 MG tablet 15 tablet 5     Sig: Take 1 tablet by mouth Daily As Needed for Erectile Dysfunction.   • potassium chloride ER (K-TAB) 20 MEQ tablet controlled-release ER tablet 120 tablet 1     Sig: Take 1 tablet by mouth 2 (Two) Times a Day.      Last office visit with prescribing clinician: 3/7/2022      Next office visit with prescribing clinician: 4/12/2022            Lindsay Soto Rep  04/06/22, 09:23 CDT     Pt called stating that he is not able to get [facility] right now [facility] the [facility]. I confirmed that yesterday. He asked if these scripts could be sent to [facility] because he can use the [employer] coupons or see if his other insurance will cover the medication. The only one that will not be covered is the Trulicity.

## 2022-04-12 ENCOUNTER — OFFICE VISIT (OUTPATIENT)
Dept: FAMILY MEDICINE CLINIC | Facility: CLINIC | Age: 67
End: 2022-04-12

## 2022-04-12 VITALS
DIASTOLIC BLOOD PRESSURE: 84 MMHG | TEMPERATURE: 97.3 F | SYSTOLIC BLOOD PRESSURE: 180 MMHG | BODY MASS INDEX: 47.71 KG/M2 | OXYGEN SATURATION: 96 % | HEART RATE: 73 BPM | WEIGHT: 304 LBS | HEIGHT: 67 IN

## 2022-04-12 DIAGNOSIS — I10 PRIMARY HYPERTENSION: ICD-10-CM

## 2022-04-12 DIAGNOSIS — J45.20 MILD INTERMITTENT ASTHMA WITHOUT COMPLICATION: Primary | ICD-10-CM

## 2022-04-12 PROCEDURE — 99214 OFFICE O/P EST MOD 30 MIN: CPT | Performed by: STUDENT IN AN ORGANIZED HEALTH CARE EDUCATION/TRAINING PROGRAM

## 2022-04-12 RX ORDER — ALBUTEROL SULFATE 90 UG/1
2 AEROSOL, METERED RESPIRATORY (INHALATION) EVERY 4 HOURS PRN
Qty: 18 G | Refills: 3 | Status: SHIPPED | OUTPATIENT
Start: 2022-04-12 | End: 2022-06-24 | Stop reason: SDUPTHER

## 2022-04-12 RX ORDER — MONTELUKAST SODIUM 10 MG/1
10 TABLET ORAL NIGHTLY
Qty: 90 TABLET | Refills: 3 | Status: SHIPPED | OUTPATIENT
Start: 2022-04-12

## 2022-04-12 NOTE — PATIENT INSTRUCTIONS
Hypertension, Adult  Hypertension is another name for high blood pressure. High blood pressure forces your heart to work harder to pump blood. This can cause problems over time.  There are two numbers in a blood pressure reading. There is a top number (systolic) over a bottom number (diastolic). It is best to have a blood pressure that is below 120/80. Healthy choices can help lower your blood pressure, or you may need medicine to help lower it.  What are the causes?  The cause of this condition is not known. Some conditions may be related to high blood pressure.  What increases the risk?  Smoking.  Having type 2 diabetes mellitus, high cholesterol, or both.  Not getting enough exercise or physical activity.  Being overweight.  Having too much fat, sugar, calories, or salt (sodium) in your diet.  Drinking too much alcohol.  Having long-term (chronic) kidney disease.  Having a family history of high blood pressure.  Age. Risk increases with age.  Race. You may be at higher risk if you are .  Gender. Men are at higher risk than women before age 45. After age 65, women are at higher risk than men.  Having obstructive sleep apnea.  Stress.  What are the signs or symptoms?  High blood pressure may not cause symptoms. Very high blood pressure (hypertensive crisis) may cause:  Headache.  Feelings of worry or nervousness (anxiety).  Shortness of breath.  Nosebleed.  A feeling of being sick to your stomach (nausea).  Throwing up (vomiting).  Changes in how you see.  Very bad chest pain.  Seizures.  How is this treated?  This condition is treated by making healthy lifestyle changes, such as:  Eating healthy foods.  Exercising more.  Drinking less alcohol.  Your health care provider may prescribe medicine if lifestyle changes are not enough to get your blood pressure under control, and if:  Your top number is above 130.  Your bottom number is above 80.  Your personal target blood pressure may vary.  Follow  these instructions at home:  Eating and drinking    If told, follow the DASH eating plan. To follow this plan:  Fill one half of your plate at each meal with fruits and vegetables.  Fill one fourth of your plate at each meal with whole grains. Whole grains include whole-wheat pasta, brown rice, and whole-grain bread.  Eat or drink low-fat dairy products, such as skim milk or low-fat yogurt.  Fill one fourth of your plate at each meal with low-fat (lean) proteins. Low-fat proteins include fish, chicken without skin, eggs, beans, and tofu.  Avoid fatty meat, cured and processed meat, or chicken with skin.  Avoid pre-made or processed food.  Eat less than 1,500 mg of salt each day.  Do not drink alcohol if:  Your doctor tells you not to drink.  You are pregnant, may be pregnant, or are planning to become pregnant.  If you drink alcohol:  Limit how much you use to:  0-1 drink a day for women.  0-2 drinks a day for men.  Be aware of how much alcohol is in your drink. In the [place] one drink equals one 12 oz bottle of beer (355 mL), one 5 oz glass of wine (148 mL), or one 1½ oz glass of hard liquor (44 mL).    Lifestyle    Work with your doctor to stay at a healthy weight or to lose weight. Ask your doctor what the best weight is for you.  Get at least 30 minutes of exercise most days of the week. This may include walking, swimming, or biking.  Get at least 30 minutes of exercise that strengthens your muscles (resistance exercise) at least 3 days a week. This may include lifting weights or doing Pilates.  Do not use any products that contain nicotine or tobacco, such as cigarettes, e-cigarettes, and chewing tobacco. If you need help quitting, ask your doctor.  Check your blood pressure at home as told by your doctor.  Keep all follow-up visits as told by your doctor. This is important.    Medicines  Take over-the-counter and prescription medicines only as told by your doctor. Follow directions carefully.  Do not skip doses  of blood pressure medicine. The medicine does not work as well if you skip doses. Skipping doses also puts you at risk for problems.  Ask your doctor about side effects or reactions to medicines that you should watch for.  Contact a doctor if you:  Think you are having a reaction to the medicine you are taking.  Have headaches that keep coming back (recurring).  Feel dizzy.  Have swelling in your ankles.  Have trouble with your vision.  Get help right away if you:  Get a very bad headache.  Start to feel mixed up (confused).  Feel weak or numb.  Feel faint.  Have very bad pain in your:  Chest.  Belly (abdomen).  Throw up more than once.  Have trouble breathing.  Summary  Hypertension is another name for high blood pressure.  High blood pressure forces your heart to work harder to pump blood.  For most people, a normal blood pressure is less than 120/80.  Making healthy choices can help lower blood pressure. If your blood pressure does not get lower with healthy choices, you may need to take medicine.  This information is not intended to replace advice given to you by your health care provider. Make sure you discuss any questions you have with your health care provider.  Document Revised: 08/28/2019 Document Reviewed: 08/28/2019  [employer] Patient Education © 2021 [employer]  Type 2 Diabetes Mellitus, Self-Care, Adult  When you have type 2 diabetes (type 2 diabetes mellitus), you must make sure your blood sugar (glucose) stays in a healthy range. You can do this with:  Nutrition.  Exercise.  Lifestyle changes.  Medicines or insulin, if needed.  Support from your doctors and others.  What are the risks?  Having diabetes can raise your risk for other long-term (chronic) health problems. You may get medicines to help prevent these problems.  How to stay aware of blood sugar    Check your blood sugar level every day, as often as told.  Have your A1C (hemoglobin A1C) level checked two or more times a year. Have it  checked more often if told.  Your doctor will set personal treatment goals for you. In general, you should have these blood sugar levels:  Before meals:  mg/dL (4.4-7.2 mmol/L).  After meals: below 180 mg/dL (10 mmol/L).  A1C: less than 7%.  How to manage high and low blood sugar  Symptoms of high blood sugar  High blood sugar is also called hyperglycemia. Know the symptoms of high blood sugar. These may include:  More thirst.  Hunger.  Feeling very tired.  Needing to pee (urinate) more often than normal.  Seeing things blurry.  Symptoms of low blood sugar  Low blood sugar is also called hypoglycemia. This is when blood sugar is at or below 70 mg/dL (3.9 mmol/L). Symptoms may include:  Hunger.  Feeling worried or nervous (anxious).  Feeling sweaty and cold to the touch (clammy).  Being dizzy or light-headed.  Feeling sleepy.  A fast heartbeat.  Feeling grouchy (irritable).  Tingling or loss of feeling (numbness) around your mouth, lips, or tongue.  Restless sleep.  Diabetes medicines can cause low blood sugar. You are more at risk:  While you exercise.  After exercise.  During sleep.  When you are sick.  When you skip meals or do not eat for a long time.  Treating low blood sugar  If you think you have low blood sugar, eat or drink something sugary right away. Keep 15 grams of a fast-acting carb (carbohydrate) with you all the time. Make sure your family and friends know how to treat you if you cannot treat yourself.  Treating very low blood sugar  Severe hypoglycemia is when your blood sugar is at or below 54 mg/dL (3 mmol/L). Severe hypoglycemia is an emergency. Do not wait to see if the symptoms will go away. Get medical help right away. Call your friend learn how to check your blood sugar and how to give you a glucagon shot. Ask your doctor if you should have a kit for glucagon shots.  Follow these instructions at home:  Medicines  Take diabetes medicines as told. If your doctor prescribed insulin or  diabetes medicines, take them each day.  Do not run out of insulin or other medicines. Plan ahead.  If you use insulin, change the amount you take based on how active you are and what foods you eat. Your doctor will tell you how to do this.  Take over-the-counter and prescription medicines only as told by your doctor.  Eating and drinking    Eat healthy foods. These include:  Low-fat (lean) proteins.  Complex carbs, such as whole grains.  Fresh fruits and vegetables.  Low-fat dairy products.  Healthy fats.  Meet with a food expert (dietitian) to make an eating plan.  Follow instructions from your doctor about what you cannot eat or drink.  Drink enough fluid to keep your pee (urine) pale yellow.  Keep track of carbs that you eat. Read food labels and learn serving sizes of foods.  Follow your sick-day plan when you cannot eat or drink as normal. Make this plan with your doctor so it is ready to use.    Activity  Exercise as told by your doctor. You may need to:  Do stretching and strength exercises 2 or more times a week.  Do 150 minutes or more of exercise each week that makes your heart beat faster and makes you sweat.  Spread out your exercise over 3 or more days a week.  Do not go more than 2 days in a row without exercise.  Talk with your doctor before you start a new exercise. Your doctor may tell you to change:  How much insulin or medicines you take.  How much food you eat.  Lifestyle  Do not use any products that contain nicotine or tobacco, such as cigarettes, e-cigarettes, and chewing tobacco. If you need help quitting, ask your doctor.  If you drink alcohol and your doctor says alcohol is safe for you:  Limit how much you use to:  0-1 drink a day for women who are not pregnant.  0-2 drinks a day for men.  Be aware of how much alcohol is in your drink. In the [place] one drink equals one 12 oz bottle of beer (355 mL), one 5 oz glass of wine (148 mL), or one 1½ oz glass of hard liquor (44 mL).  Learn to deal  with stress. If you need help, ask your doctor.  Body care    Stay up to date with your shots (immunizations).  Have your eyes and feet checked by a doctor as often as told.  Check your skin and feet every day. Check for cuts, bruises, redness, blisters, or sores.  Brush your teeth and gums two times a day. Floss one or more times a day.  Go to the dentist one or more times every 6 months.  Stay at a healthy weight.    General instructions  Share your diabetes care plan with:  Your work or school.  People you live with.  Carry a card or wear jewelry that says you have diabetes.  Keep all follow-up visits as told by your doctor. This is important.  Questions to ask your doctor  Do I need to meet with a certified expert in diabetes education and care?  Where can I find a support group?  Where to find more information  American Diabetes Association: www.diabetes.org  American Association of Diabetes Care and Education Specialists: www.diabeteseducator.org  International Diabetes Federation: www.idf.org  Summary  When you have type 2 diabetes, you must make sure your blood sugar (glucose) stays in a healthy range. You can do this with nutrition, exercise, medicines and insulin, and support from doctors and others.  Check your blood sugar every day, or as often as told.  Having diabetes can raise your risk for other long-term health problems. You may get medicines to help prevent these problems.  Share your diabetes management plan with people at work, school, and home.  Keep all follow-up visits as told by your doctor. This is important.  This information is not intended to replace advice given to you by your health care provider. Make sure you discuss any questions you have with your health care provider.  Document Revised: 07/21/2021 Document Reviewed: 07/21/2021  [employer] Patient Education © 2021 [employer]

## 2022-04-12 NOTE — PROGRESS NOTES
"Subjective:  Azael Bray is a 66 y.o. male who presents for     HTN; States that BP at home has been slightly elevated, usually 142/70. Denied any chest pain, SOA, numbness, tingling, weakness, swelling, orthopnea. States believes it is elevated today due to [facility] and getting [facility].     Asthma; States has asthma, usually only when seasonal allergies act up. Does not smoke, states that for the last several nights has woken up coughing due to allergies/asthma. Periodic wheezing but no SOA on exertion.     There is no problem list on file for this patient.    Vitals:    Vitals:    04/12/22 0819   BP: 180/84   BP Location: Right arm   Patient Position: Sitting   Cuff Size: Large Adult   Pulse: 73   Temp: 97.3 °F (36.3 °C)   SpO2: 96%   Weight: (!) 138 kg (304 lb)   Height: 170.2 cm (67\")     Body mass index is 47.61 kg/m².      Current Outpatient Medications:   •  carvedilol (COREG) 25 MG tablet, Take 1 tablet by mouth 2 (Two) Times a Day With Meals., Disp: 180 tablet, Rfl: 1  •  chlorthalidone (HYGROTEN) 50 MG tablet, Take 1 tablet by mouth Daily., Disp: 90 tablet, Rfl: 1  •  cholecalciferol (VITAMIN D3) 25 MCG (1000 UT) tablet, Take 1 tablet by mouth Daily., Disp: 90 tablet, Rfl: 1  •  gabapentin (NEURONTIN) 800 MG tablet, Take 1 tablet by mouth 3 (Three) Times a Day., Disp: 90 tablet, Rfl: 2  •  glucose blood test strip, Use daily and as need up to TID, Disp: 100 each, Rfl: 5  •  glucose monitor monitoring kit, Use daily and as need up to TID, Disp: 1 each, Rfl: 0  •  hydrALAZINE (APRESOLINE) 50 MG tablet, Take 1 tablet by mouth 3 (Three) Times a Day., Disp: 180 tablet, Rfl: 2  •  insulin glargine (Lantus) 100 UNIT/ML injection, Inject 60 Units under the skin into the appropriate area as directed Every Morning Before Breakfast., Disp: 18 mL, Rfl: 1  •  Insulin Syringes, Disposable, U-100 0.3 ML misc, 1 each Daily., Disp: 100 each, Rfl: 1  •  Lancets (freestyle) lancets, Use daily and as need " up to TID, Disp: 100 each, Rfl: 5  •  losartan (Cozaar) 50 MG tablet, Take 1 tablet by mouth Daily., Disp: 90 tablet, Rfl: 1  •  NIFEdipine XL (PROCARDIA XL) 60 MG 24 hr tablet, Take 1 tablet by mouth Daily., Disp: 90 tablet, Rfl: 1  •  Omega-3 Fatty Acids (fish oil) 1000 MG capsule capsule, Take 1 capsule by mouth 2 (Two) Times a Day With Meals., Disp: 90 capsule, Rfl: 1  •  omeprazole (priLOSEC) 40 MG capsule, Take 1 capsule by mouth Daily., Disp: 90 capsule, Rfl: 1  •  potassium chloride ER (K-TAB) 20 MEQ tablet controlled-release ER tablet, Take 1 tablet by mouth 2 (Two) Times a Day., Disp: 120 tablet, Rfl: 1  •  rosuvastatin (CRESTOR) 40 MG tablet, Take 0.5 tablets by mouth 3 (Three) Times a Week., Disp: 90 tablet, Rfl: 1  •  spironolactone (ALDACTONE) 100 MG tablet, Take 1 tablet by mouth Daily., Disp: 90 tablet, Rfl: 1  •  vitamin B-12 (CYANOCOBALAMIN) 500 MCG tablet, Take 1 tablet by mouth Daily., Disp: 90 tablet, Rfl: 1  •  albuterol sulfate  (90 Base) MCG/ACT inhaler, Inhale 2 puffs Every 4 (Four) Hours As Needed for Wheezing or Shortness of Air., Disp: 18 g, Rfl: 3  •  Dulaglutide 4.5 MG/0.5ML solution pen-injector, Inject 4.5 mg under the skin into the appropriate area as directed 1 (One) Time Per Week., Disp: 4 pen, Rfl: 1  •  montelukast (Singulair) 10 MG tablet, Take 1 tablet by mouth Every Night., Disp: 90 tablet, Rfl: 3  •  sildenafil (Viagra) 100 MG tablet, Take 1 tablet by mouth Daily As Needed for Erectile Dysfunction., Disp: 15 tablet, Rfl: 5    There is no problem list on file for this patient.    Past Surgical History:   Procedure Laterality Date   • APPENDECTOMY     • [facility]  [facility]   • HERNIA REPAIR Left      Social History     Socioeconomic History   • Marital status:    Tobacco Use   • Smoking status: Former Smoker   • Smokeless tobacco: Never Used   Vaping Use   • Vaping Use: Never used   Substance and Sexual Activity   • Alcohol use: Not Currently   • Drug use:  Never   • Sexual activity: Not Currently     Family History   Problem Relation Age of Onset   • Diabetes Father    • Hypertension Father    • Heart disease Father    • Diabetes Paternal Grandfather      Office Visit on 03/07/2022   Component Date Value Ref Range Status   • Hemoglobin A1C 03/07/2022 8.5  % Final   Office Visit on 12/07/2021   Component Date Value Ref Range Status   • Hemoglobin A1C 12/07/2021 9.0 (A) % Final   • Lot Number 12/07/2021 [id number]   Final   • Expiration Date 12/07/2021 [id number]   Final   Office Visit on 11/19/2021   Component Date Value Ref Range Status   • Glucose 03/21/2022 91  65 - 99 mg/dL Final   • BUN 03/21/2022 13  8 - 23 mg/dL Final   • Creatinine 03/21/2022 1.20  0.76 - 1.27 mg/dL Final   • Sodium 03/21/2022 143  136 - 145 mmol/L Final   • Potassium 03/21/2022 3.3 (A) 3.5 - 5.2 mmol/L Final   • Chloride 03/21/2022 101  98 - 107 mmol/L Final   • CO2 03/21/2022 28.9  22.0 - 29.0 mmol/L Final   • Calcium 03/21/2022 9.4  8.6 - 10.5 mg/dL Final   • BUN/Creatinine Ratio 03/21/2022 10.8  7.0 - 25.0 Final   • Anion Gap 03/21/2022 13.1  5.0 - 15.0 mmol/L Final   • eGFR 03/21/2022 66.3  >60.0 mL/min/1.73 Final    National Kidney Foundation and American Society of Nephrology (ASN) Task Force recommended calculation based on the Chronic Kidney Disease Epidemiology Collaboration (CKD-EPI) equation refit without adjustment for race.   Office Visit on 10/26/2021   Component Date Value Ref Range Status   • Glucose 10/26/2021 150 (A) 65 - 99 mg/dL Final   • BUN 10/26/2021 21  8 - 23 mg/dL Final   • Creatinine 10/26/2021 1.37 (A) 0.76 - 1.27 mg/dL Final   • Sodium 10/26/2021 140  136 - 145 mmol/L Final   • Potassium 10/26/2021 3.3 (A) 3.5 - 5.2 mmol/L Final   • Chloride 10/26/2021 98  98 - 107 mmol/L Final   • CO2 10/26/2021 31.7 (A) 22.0 - 29.0 mmol/L Final   • Calcium 10/26/2021 9.6  8.6 - 10.5 mg/dL Final   • eGFR   Amer 10/26/2021 63  >60 mL/min/1.73 Final   • BUN/Creatinine Ratio  10/26/2021 15.3  7.0 - 25.0 Final   • Anion Gap 10/26/2021 10.3  5.0 - 15.0 mmol/L Final   • THC, Screen, Urine 10/26/2021 Negative  Negative Final   • Phencyclidine (PCP), Urine 10/26/2021 Negative  Negative Final   • Cocaine Screen, Urine 10/26/2021 Negative  Negative Final   • Methamphetamine, Ur 10/26/2021 Negative  Negative Final   • Opiate Screen 10/26/2021 Negative  Negative Final   • Amphetamine Screen, Urine 10/26/2021 Negative  Negative Final   • Benzodiazepine Screen, Urine 10/26/2021 Negative  Negative Final   • Tricyclic Antidepressants Screen 10/26/2021 Negative  Negative Final   • Methadone Screen, Urine 10/26/2021 Negative  Negative Final   • Barbiturates Screen, Urine 10/26/2021 Negative  Negative Final   • Oxycodone Screen, Urine 10/26/2021 Negative  Negative Final   • Propoxyphene Screen 10/26/2021 Negative  Negative Final   • Buprenorphine, Screen, Urine 10/26/2021 Negative  Negative Final      US Thyroid  Narrative: EXAM DESCRIPTION:  US THYROID 9/10/2021 2:00 PM CDT    [facility]: US THYROID     CLINICAL HISTORY:  65 years Male, left sided neck pain with swallowing., M54.2  Cervicalgia    COMPARISON:  None.    TECHNIQUE:  Real-time grayscale color Doppler images were obtained through  the thyroid gland.  37 images.    FINDINGS:  The right lobe of thyroid gland measures 1.9 x 2 x 5.8 cm.  The right lobe of thyroid gland is minimally heterogeneous.  Color Doppler imaging demonstrates flow to the right lobe of the  thyroid gland.  No focal lesions are perceived within the right lobe of thyroid  gland.    The left lobe of thyroid gland measures 2.2 x 2.6 x 5.5 cm.  The left lobe of thyroid gland is homogeneous.  Color Doppler imaging suggests flow to left lobe of thyroid  gland.    The thyroid isthmus is homogeneous and normal 7 mm.  A left neck lymph node is present within the region of concern.  This lymph node appears normal, and measures 6 x 11 x 13 mm.  Impression: Normal    Electronically signed  by:  Higinio Franco MD  9/12/2021 9:38 PM CDT  Workstation: [id number]      [unfilled]  Immunization History   Administered Date(s) Administered   • COVID-19 (MODERNA) 1st, 2nd, 3rd Dose Only 02/24/2021, 03/24/2021, 11/02/2021, 04/07/2022   • Fluzone High-Dose 65+yrs 09/30/2021   • Shingrix 12/07/2019, 06/07/2020   • Tdap 12/07/2019     The following portions of the patient's history were reviewed and updated as appropriate: allergies, current medications, past family history, past medical history, past social history, past surgical history and problem list.    PHQ-9 Total Score: 16         Physical Exam  Constitutional:       General: He is not in acute distress.  HENT:      Head: Normocephalic and atraumatic.   Cardiovascular:      Rate and Rhythm: Normal rate and regular rhythm.      Heart sounds: Normal heart sounds. No murmur heard.  Pulmonary:      Effort: Pulmonary effort is normal.      Breath sounds: Normal breath sounds.   Abdominal:      Palpations: Abdomen is soft.      Tenderness: There is no abdominal tenderness.   Musculoskeletal:         General: No swelling.      Right lower leg: No edema.      Left lower leg: No edema.   Skin:     Coloration: Skin is not jaundiced.      Findings: No rash.   Neurological:      Mental Status: He is alert and oriented to person, place, and time. Mental status is at baseline.   Psychiatric:         Mood and Affect: Mood normal.         Behavior: Behavior normal.         Assessment/Plan    Diagnosis Plan   1. Mild intermittent asthma without complication  albuterol sulfate  (90 Base) MCG/ACT inhaler    montelukast (Singulair) 10 MG tablet   2. Primary hypertension        No orders of the defined types were placed in this encounter.    Asthma; patient states has albuterol at home, needs refills today.  Not on allergy medicines, does endorse some coughing, nighttime awakening, concerning.  Exam benign, no wheezing, respiratory distress, vital stable.  Will treat  with Singulair, albuterol as above.  Consider inhaled corticosteroid at follow-up.    Hypertension; elevated today however patient states it is much better controlled at home, declined medication change at this time, consider increasing losartan to 100 mg daily at follow-up.  Follow-up in 1 to 2 months blood pressure log, sooner if needed.          This document has been electronically signed by Rafat Lin MD on April 12, 2022 09:19 CDT    EMR Dragon/Transciption Disclaimer: Some of this note may be an electronic transcription/translation of spoken language to printed text.  The electronic translation of spoken language may permit erroneous, or at times, nonsensical words or phrases to be inadvertently transcribed. Although I have reviewed the note for such errors, some may still exist.

## 2022-04-18 ENCOUNTER — TELEPHONE (OUTPATIENT)
Dept: FAMILY MEDICINE CLINIC | Facility: CLINIC | Age: 67
End: 2022-04-18

## 2022-05-09 RX ORDER — MELATONIN
1000 DAILY
Qty: 90 TABLET | Refills: 1 | Status: SHIPPED | OUTPATIENT
Start: 2022-05-09 | End: 2022-05-09 | Stop reason: SDUPTHER

## 2022-05-09 RX ORDER — MELATONIN
1000 DAILY
Qty: 90 TABLET | Refills: 1 | Status: SHIPPED | OUTPATIENT
Start: 2022-05-09 | End: 2022-06-24 | Stop reason: SDUPTHER

## 2022-05-09 NOTE — TELEPHONE ENCOUNTER
[facility]. While he was here, he requested a refill for his Vit D.  Rx Refill Note  Requested Prescriptions     Pending Prescriptions Disp Refills   • cholecalciferol (VITAMIN D3) 25 MCG (1000 UT) tablet 90 tablet 1     Sig: Take 1 tablet by mouth Daily.      Last office visit with prescribing clinician: 4/12/2022      Next office visit with prescribing clinician: 6/13/2022       {TIP  Please add Last Relevant Lab Date if appropriate:23}     Lindsay Soto Rep  05/09/22, 08:04 CDT

## 2022-05-11 ENCOUNTER — TELEPHONE (OUTPATIENT)
Dept: FAMILY MEDICINE CLINIC | Facility: CLINIC | Age: 67
End: 2022-05-11

## 2022-05-11 NOTE — TELEPHONE ENCOUNTER
Patient called asked about a CT he had done at [facility]   Please give him a call

## 2022-05-11 NOTE — TELEPHONE ENCOUNTER
Called pt back. He said he went to the Er. He said he had problems with his throat last week. He ate peanuts and one of them lodged in his throat around the tonsil area. They ordered a CT and he had it done but then the ER doctor said there was a problem with the CT and they were going to fix it but they never did. This occurred on Monday 5/9/22 at 10 am.      Requested records.

## 2022-06-24 ENCOUNTER — OFFICE VISIT (OUTPATIENT)
Dept: FAMILY MEDICINE CLINIC | Facility: CLINIC | Age: 67
End: 2022-06-24

## 2022-06-24 VITALS
WEIGHT: 305 LBS | DIASTOLIC BLOOD PRESSURE: 80 MMHG | BODY MASS INDEX: 47.87 KG/M2 | HEART RATE: 61 BPM | TEMPERATURE: 96.9 F | OXYGEN SATURATION: 96 % | HEIGHT: 67 IN | SYSTOLIC BLOOD PRESSURE: 164 MMHG

## 2022-06-24 DIAGNOSIS — J45.20 MILD INTERMITTENT ASTHMA WITHOUT COMPLICATION: ICD-10-CM

## 2022-06-24 DIAGNOSIS — I10 PRIMARY HYPERTENSION: Primary | ICD-10-CM

## 2022-06-24 DIAGNOSIS — E11.40 TYPE 2 DIABETES MELLITUS WITH DIABETIC NEUROPATHY, WITH LONG-TERM CURRENT USE OF INSULIN: ICD-10-CM

## 2022-06-24 DIAGNOSIS — E78.5 HYPERLIPIDEMIA, UNSPECIFIED HYPERLIPIDEMIA TYPE: ICD-10-CM

## 2022-06-24 DIAGNOSIS — Z79.4 TYPE 2 DIABETES MELLITUS WITH DIABETIC NEUROPATHY, WITH LONG-TERM CURRENT USE OF INSULIN: ICD-10-CM

## 2022-06-24 DIAGNOSIS — R09.82 PND (POST-NASAL DRIP): ICD-10-CM

## 2022-06-24 DIAGNOSIS — E87.6 HYPOKALEMIA: ICD-10-CM

## 2022-06-24 DIAGNOSIS — Z79.4 TYPE 2 DIABETES MELLITUS WITH HYPERGLYCEMIA, WITH LONG-TERM CURRENT USE OF INSULIN: ICD-10-CM

## 2022-06-24 DIAGNOSIS — E55.9 VITAMIN D DEFICIENCY: ICD-10-CM

## 2022-06-24 DIAGNOSIS — E11.65 TYPE 2 DIABETES MELLITUS WITH HYPERGLYCEMIA, WITH LONG-TERM CURRENT USE OF INSULIN: ICD-10-CM

## 2022-06-24 DIAGNOSIS — R13.10 DYSPHAGIA, UNSPECIFIED TYPE: ICD-10-CM

## 2022-06-24 PROCEDURE — 99214 OFFICE O/P EST MOD 30 MIN: CPT | Performed by: STUDENT IN AN ORGANIZED HEALTH CARE EDUCATION/TRAINING PROGRAM

## 2022-06-24 RX ORDER — HYDRALAZINE HYDROCHLORIDE 50 MG/1
50 TABLET, FILM COATED ORAL 3 TIMES DAILY
Qty: 180 TABLET | Refills: 2 | Status: SHIPPED | OUTPATIENT
Start: 2022-06-24 | End: 2022-07-26 | Stop reason: SDUPTHER

## 2022-06-24 RX ORDER — ALBUTEROL SULFATE 90 UG/1
2 AEROSOL, METERED RESPIRATORY (INHALATION) EVERY 4 HOURS PRN
Qty: 18 G | Refills: 3 | Status: SHIPPED | OUTPATIENT
Start: 2022-06-24 | End: 2022-06-24

## 2022-06-24 RX ORDER — CHLORTHALIDONE 50 MG/1
50 TABLET ORAL DAILY
Qty: 90 TABLET | Refills: 1 | Status: SHIPPED | OUTPATIENT
Start: 2022-06-24 | End: 2022-07-26 | Stop reason: SDUPTHER

## 2022-06-24 RX ORDER — CARVEDILOL 25 MG/1
25 TABLET ORAL 2 TIMES DAILY WITH MEALS
Qty: 180 TABLET | Refills: 1 | Status: SHIPPED | OUTPATIENT
Start: 2022-06-24 | End: 2022-07-26 | Stop reason: SDUPTHER

## 2022-06-24 RX ORDER — SPIRONOLACTONE 100 MG/1
100 TABLET, FILM COATED ORAL DAILY
Qty: 90 TABLET | Refills: 1 | Status: SHIPPED | OUTPATIENT
Start: 2022-06-24 | End: 2022-07-26 | Stop reason: SDUPTHER

## 2022-06-24 RX ORDER — LOSARTAN POTASSIUM 50 MG/1
50 TABLET ORAL DAILY
Qty: 90 TABLET | Refills: 1 | Status: SHIPPED | OUTPATIENT
Start: 2022-06-24 | End: 2022-07-26 | Stop reason: SDUPTHER

## 2022-06-24 RX ORDER — AZELASTINE 1 MG/ML
2 SPRAY, METERED NASAL 2 TIMES DAILY
Qty: 30 ML | Refills: 12 | Status: SHIPPED | OUTPATIENT
Start: 2022-06-24 | End: 2023-01-27 | Stop reason: SDUPTHER

## 2022-06-24 RX ORDER — ROSUVASTATIN CALCIUM 40 MG/1
20 TABLET, COATED ORAL 3 TIMES WEEKLY
Qty: 90 TABLET | Refills: 1 | Status: SHIPPED | OUTPATIENT
Start: 2022-06-24 | End: 2023-01-27 | Stop reason: SDUPTHER

## 2022-06-24 RX ORDER — HYDRALAZINE HYDROCHLORIDE 50 MG/1
50 TABLET, FILM COATED ORAL 3 TIMES DAILY
Qty: 180 TABLET | Refills: 2 | Status: SHIPPED | OUTPATIENT
Start: 2022-06-24 | End: 2022-06-24

## 2022-06-24 RX ORDER — CARVEDILOL 25 MG/1
25 TABLET ORAL 2 TIMES DAILY WITH MEALS
Qty: 180 TABLET | Refills: 1 | Status: SHIPPED | OUTPATIENT
Start: 2022-06-24 | End: 2022-06-24

## 2022-06-24 RX ORDER — GABAPENTIN 800 MG/1
800 TABLET ORAL 3 TIMES DAILY
Qty: 90 TABLET | Refills: 2 | Status: SHIPPED | OUTPATIENT
Start: 2022-06-24 | End: 2022-06-24

## 2022-06-24 RX ORDER — NIFEDIPINE 60 MG/1
60 TABLET, EXTENDED RELEASE ORAL DAILY
Qty: 90 TABLET | Refills: 1 | Status: SHIPPED | OUTPATIENT
Start: 2022-06-24 | End: 2022-06-24

## 2022-06-24 RX ORDER — GABAPENTIN 800 MG/1
800 TABLET ORAL 3 TIMES DAILY
Qty: 90 TABLET | Refills: 2 | Status: SHIPPED | OUTPATIENT
Start: 2022-06-24 | End: 2022-07-26 | Stop reason: SDUPTHER

## 2022-06-24 RX ORDER — ALBUTEROL SULFATE 90 UG/1
2 AEROSOL, METERED RESPIRATORY (INHALATION) EVERY 4 HOURS PRN
Qty: 18 G | Refills: 3 | Status: SHIPPED | OUTPATIENT
Start: 2022-06-24 | End: 2022-07-26 | Stop reason: SDUPTHER

## 2022-06-24 RX ORDER — MELATONIN
1000 DAILY
Qty: 90 TABLET | Refills: 1 | Status: SHIPPED | OUTPATIENT
Start: 2022-06-24 | End: 2022-07-26 | Stop reason: ALTCHOICE

## 2022-06-24 RX ORDER — POTASSIUM CHLORIDE 1500 MG/1
20 TABLET, FILM COATED, EXTENDED RELEASE ORAL 2 TIMES DAILY
Qty: 120 TABLET | Refills: 1 | Status: SHIPPED | OUTPATIENT
Start: 2022-06-24 | End: 2022-07-26 | Stop reason: SDUPTHER

## 2022-06-24 RX ORDER — NIFEDIPINE 60 MG/1
60 TABLET, EXTENDED RELEASE ORAL DAILY
Qty: 90 TABLET | Refills: 1 | Status: SHIPPED | OUTPATIENT
Start: 2022-06-24 | End: 2022-07-26 | Stop reason: SDUPTHER

## 2022-06-24 RX ORDER — INSULIN GLARGINE 100 [IU]/ML
60 INJECTION, SOLUTION SUBCUTANEOUS
Qty: 18 ML | Refills: 1 | Status: SHIPPED | OUTPATIENT
Start: 2022-06-24 | End: 2022-10-27 | Stop reason: SDUPTHER

## 2022-06-24 RX ORDER — INSULIN GLARGINE 100 [IU]/ML
60 INJECTION, SOLUTION SUBCUTANEOUS
Qty: 18 ML | Refills: 1 | Status: SHIPPED | OUTPATIENT
Start: 2022-06-24 | End: 2022-06-24

## 2022-06-24 RX ORDER — CHLORTHALIDONE 50 MG/1
50 TABLET ORAL DAILY
Qty: 90 TABLET | Refills: 1 | Status: SHIPPED | OUTPATIENT
Start: 2022-06-24 | End: 2022-06-24

## 2022-07-26 ENCOUNTER — LAB (OUTPATIENT)
Dept: LAB | Facility: HOSPITAL | Age: 67
End: 2022-07-26

## 2022-07-26 ENCOUNTER — OFFICE VISIT (OUTPATIENT)
Dept: FAMILY MEDICINE CLINIC | Facility: CLINIC | Age: 67
End: 2022-07-26

## 2022-07-26 VITALS
BODY MASS INDEX: 48.5 KG/M2 | SYSTOLIC BLOOD PRESSURE: 142 MMHG | TEMPERATURE: 97.1 F | HEIGHT: 67 IN | OXYGEN SATURATION: 94 % | WEIGHT: 309 LBS | DIASTOLIC BLOOD PRESSURE: 82 MMHG | HEART RATE: 62 BPM

## 2022-07-26 DIAGNOSIS — E87.6 HYPOKALEMIA: ICD-10-CM

## 2022-07-26 DIAGNOSIS — I10 RESISTANT HYPERTENSION: Primary | ICD-10-CM

## 2022-07-26 DIAGNOSIS — E11.65 TYPE 2 DIABETES MELLITUS WITH HYPERGLYCEMIA, WITH LONG-TERM CURRENT USE OF INSULIN: ICD-10-CM

## 2022-07-26 DIAGNOSIS — E11.40 TYPE 2 DIABETES MELLITUS WITH DIABETIC NEUROPATHY, WITH LONG-TERM CURRENT USE OF INSULIN: ICD-10-CM

## 2022-07-26 DIAGNOSIS — J45.20 MILD INTERMITTENT ASTHMA WITHOUT COMPLICATION: ICD-10-CM

## 2022-07-26 DIAGNOSIS — Z79.4 TYPE 2 DIABETES MELLITUS WITH HYPERGLYCEMIA, WITH LONG-TERM CURRENT USE OF INSULIN: ICD-10-CM

## 2022-07-26 DIAGNOSIS — G47.33 OBSTRUCTIVE SLEEP APNEA SYNDROME: ICD-10-CM

## 2022-07-26 DIAGNOSIS — K21.9 GASTROESOPHAGEAL REFLUX DISEASE, UNSPECIFIED WHETHER ESOPHAGITIS PRESENT: ICD-10-CM

## 2022-07-26 DIAGNOSIS — Z79.4 TYPE 2 DIABETES MELLITUS WITH DIABETIC NEUROPATHY, WITH LONG-TERM CURRENT USE OF INSULIN: ICD-10-CM

## 2022-07-26 LAB
ALBUMIN SERPL-MCNC: 3.9 G/DL (ref 3.5–5.2)
ALBUMIN/GLOB SERPL: 1 G/DL
ALP SERPL-CCNC: 52 U/L (ref 39–117)
ALT SERPL W P-5'-P-CCNC: 18 U/L (ref 1–41)
ANION GAP SERPL CALCULATED.3IONS-SCNC: 14.1 MMOL/L (ref 5–15)
AST SERPL-CCNC: 20 U/L (ref 1–40)
BASOPHILS # BLD AUTO: 0.05 10*3/MM3 (ref 0–0.2)
BASOPHILS NFR BLD AUTO: 0.5 % (ref 0–1.5)
BILIRUB SERPL-MCNC: 0.5 MG/DL (ref 0–1.2)
BUN SERPL-MCNC: 26 MG/DL (ref 8–23)
BUN/CREAT SERPL: 17.2 (ref 7–25)
CALCIUM SPEC-SCNC: 9.6 MG/DL (ref 8.6–10.5)
CHLORIDE SERPL-SCNC: 95 MMOL/L (ref 98–107)
CHOLEST SERPL-MCNC: 168 MG/DL (ref 0–200)
CO2 SERPL-SCNC: 30.9 MMOL/L (ref 22–29)
CREAT SERPL-MCNC: 1.51 MG/DL (ref 0.76–1.27)
DEPRECATED RDW RBC AUTO: 42.9 FL (ref 37–54)
EGFRCR SERPLBLD CKD-EPI 2021: 50.3 ML/MIN/1.73
EOSINOPHIL # BLD AUTO: 0.09 10*3/MM3 (ref 0–0.4)
EOSINOPHIL NFR BLD AUTO: 1 % (ref 0.3–6.2)
ERYTHROCYTE [DISTWIDTH] IN BLOOD BY AUTOMATED COUNT: 16.2 % (ref 12.3–15.4)
EXPIRATION DATE: ABNORMAL
GLOBULIN UR ELPH-MCNC: 3.9 GM/DL
GLUCOSE SERPL-MCNC: 100 MG/DL (ref 65–99)
HBA1C MFR BLD: 7.8 %
HCT VFR BLD AUTO: 46.5 % (ref 37.5–51)
HDLC SERPL-MCNC: 38 MG/DL (ref 40–60)
HGB BLD-MCNC: 15.1 G/DL (ref 13–17.7)
IMM GRANULOCYTES # BLD AUTO: 0.02 10*3/MM3 (ref 0–0.05)
IMM GRANULOCYTES NFR BLD AUTO: 0.2 % (ref 0–0.5)
LDLC SERPL CALC-MCNC: 99 MG/DL (ref 0–100)
LDLC/HDLC SERPL: 2.48 {RATIO}
LYMPHOCYTES # BLD AUTO: 2.95 10*3/MM3 (ref 0.7–3.1)
LYMPHOCYTES NFR BLD AUTO: 31.5 % (ref 19.6–45.3)
Lab: ABNORMAL
MCH RBC QN AUTO: 25 PG (ref 26.6–33)
MCHC RBC AUTO-ENTMCNC: 32.5 G/DL (ref 31.5–35.7)
MCV RBC AUTO: 76.9 FL (ref 79–97)
MONOCYTES # BLD AUTO: 0.99 10*3/MM3 (ref 0.1–0.9)
MONOCYTES NFR BLD AUTO: 10.6 % (ref 5–12)
NEUTROPHILS NFR BLD AUTO: 5.27 10*3/MM3 (ref 1.7–7)
NEUTROPHILS NFR BLD AUTO: 56.2 % (ref 42.7–76)
NRBC BLD AUTO-RTO: 0 /100 WBC (ref 0–0.2)
PLATELET # BLD AUTO: 302 10*3/MM3 (ref 140–450)
PMV BLD AUTO: 9.9 FL (ref 6–12)
POTASSIUM SERPL-SCNC: 3.4 MMOL/L (ref 3.5–5.2)
PROT SERPL-MCNC: 7.8 G/DL (ref 6–8.5)
RBC # BLD AUTO: 6.05 10*6/MM3 (ref 4.14–5.8)
SODIUM SERPL-SCNC: 140 MMOL/L (ref 136–145)
TRIGL SERPL-MCNC: 179 MG/DL (ref 0–150)
TSH SERPL DL<=0.05 MIU/L-ACNC: 1.15 UIU/ML (ref 0.27–4.2)
VLDLC SERPL-MCNC: 31 MG/DL (ref 5–40)
WBC NRBC COR # BLD: 9.37 10*3/MM3 (ref 3.4–10.8)

## 2022-07-26 PROCEDURE — 80053 COMPREHEN METABOLIC PANEL: CPT | Performed by: STUDENT IN AN ORGANIZED HEALTH CARE EDUCATION/TRAINING PROGRAM

## 2022-07-26 PROCEDURE — 99214 OFFICE O/P EST MOD 30 MIN: CPT | Performed by: STUDENT IN AN ORGANIZED HEALTH CARE EDUCATION/TRAINING PROGRAM

## 2022-07-26 PROCEDURE — 3051F HG A1C>EQUAL 7.0%<8.0%: CPT | Performed by: STUDENT IN AN ORGANIZED HEALTH CARE EDUCATION/TRAINING PROGRAM

## 2022-07-26 PROCEDURE — 85025 COMPLETE CBC W/AUTO DIFF WBC: CPT | Performed by: STUDENT IN AN ORGANIZED HEALTH CARE EDUCATION/TRAINING PROGRAM

## 2022-07-26 PROCEDURE — 83036 HEMOGLOBIN GLYCOSYLATED A1C: CPT | Performed by: STUDENT IN AN ORGANIZED HEALTH CARE EDUCATION/TRAINING PROGRAM

## 2022-07-26 PROCEDURE — 80061 LIPID PANEL: CPT | Performed by: STUDENT IN AN ORGANIZED HEALTH CARE EDUCATION/TRAINING PROGRAM

## 2022-07-26 PROCEDURE — 84443 ASSAY THYROID STIM HORMONE: CPT | Performed by: STUDENT IN AN ORGANIZED HEALTH CARE EDUCATION/TRAINING PROGRAM

## 2022-07-26 PROCEDURE — 36416 COLLJ CAPILLARY BLOOD SPEC: CPT | Performed by: STUDENT IN AN ORGANIZED HEALTH CARE EDUCATION/TRAINING PROGRAM

## 2022-07-26 RX ORDER — LOSARTAN POTASSIUM 50 MG/1
50 TABLET ORAL DAILY
Qty: 90 TABLET | Refills: 1 | Status: SHIPPED | OUTPATIENT
Start: 2022-07-26 | End: 2023-01-27 | Stop reason: SDUPTHER

## 2022-07-26 RX ORDER — CHLORTHALIDONE 50 MG/1
50 TABLET ORAL DAILY
Qty: 90 TABLET | Refills: 1 | Status: SHIPPED | OUTPATIENT
Start: 2022-07-26 | End: 2022-07-27

## 2022-07-26 RX ORDER — OMEPRAZOLE 40 MG/1
40 CAPSULE, DELAYED RELEASE ORAL DAILY
Qty: 90 CAPSULE | Refills: 1 | Status: SHIPPED | OUTPATIENT
Start: 2022-07-26 | End: 2022-07-26 | Stop reason: SDUPTHER

## 2022-07-26 RX ORDER — HYDRALAZINE HYDROCHLORIDE 50 MG/1
50 TABLET, FILM COATED ORAL 3 TIMES DAILY
Qty: 180 TABLET | Refills: 2 | Status: SHIPPED | OUTPATIENT
Start: 2022-07-26 | End: 2023-01-27 | Stop reason: SDUPTHER

## 2022-07-26 RX ORDER — SPIRONOLACTONE 100 MG/1
100 TABLET, FILM COATED ORAL DAILY
Qty: 90 TABLET | Refills: 1 | Status: SHIPPED | OUTPATIENT
Start: 2022-07-26 | End: 2023-01-27 | Stop reason: SDUPTHER

## 2022-07-26 RX ORDER — CARVEDILOL 25 MG/1
25 TABLET ORAL 2 TIMES DAILY WITH MEALS
Qty: 180 TABLET | Refills: 1 | Status: SHIPPED | OUTPATIENT
Start: 2022-07-26

## 2022-07-26 RX ORDER — NIFEDIPINE 60 MG/1
60 TABLET, EXTENDED RELEASE ORAL DAILY
Qty: 90 TABLET | Refills: 1 | Status: SHIPPED | OUTPATIENT
Start: 2022-07-26 | End: 2023-01-27 | Stop reason: SDUPTHER

## 2022-07-26 RX ORDER — GABAPENTIN 800 MG/1
800 TABLET ORAL 3 TIMES DAILY
Qty: 90 TABLET | Refills: 2 | Status: SHIPPED | OUTPATIENT
Start: 2022-07-26 | End: 2023-01-27 | Stop reason: SDUPTHER

## 2022-07-26 RX ORDER — GABAPENTIN 800 MG/1
800 TABLET ORAL 3 TIMES DAILY
Qty: 90 TABLET | Refills: 2 | Status: SHIPPED | OUTPATIENT
Start: 2022-07-26 | End: 2022-07-26 | Stop reason: SDUPTHER

## 2022-07-26 RX ORDER — ERGOCALCIFEROL 1.25 MG/1
50000 CAPSULE ORAL WEEKLY
COMMUNITY
End: 2022-10-27 | Stop reason: SDUPTHER

## 2022-07-26 RX ORDER — ALBUTEROL SULFATE 90 UG/1
2 AEROSOL, METERED RESPIRATORY (INHALATION) EVERY 4 HOURS PRN
Qty: 18 G | Refills: 3 | Status: SHIPPED | OUTPATIENT
Start: 2022-07-26 | End: 2023-01-27 | Stop reason: SDUPTHER

## 2022-07-26 RX ORDER — OMEPRAZOLE 40 MG/1
40 CAPSULE, DELAYED RELEASE ORAL DAILY
Qty: 90 CAPSULE | Refills: 1 | Status: SHIPPED | OUTPATIENT
Start: 2022-07-26 | End: 2022-10-21 | Stop reason: SDUPTHER

## 2022-07-26 RX ORDER — POTASSIUM CHLORIDE 1500 MG/1
20 TABLET, FILM COATED, EXTENDED RELEASE ORAL 2 TIMES DAILY
Qty: 120 TABLET | Refills: 1 | Status: SHIPPED | OUTPATIENT
Start: 2022-07-26 | End: 2022-10-17 | Stop reason: SDUPTHER

## 2022-07-26 NOTE — PROGRESS NOTES
"[facility] Center[facility]:  Azael Bray is a 66 y.o. male who presents for     Hypertension; currently on carvedilol 25 mg twice daily, chlorthalidone 50 mg daily, hydralazine 50 mg 3 times daily, losartan 50 mg daily, nifedipine 60 mg daily, spironolactone 100 mg daily.  States blood pressure has been 140's-170's systolic, denies any chest pain, shortness of breath, headaches, vision changes, numbness, tingling, weakness, leg swelling, orthopnea.    Postnasal drip; a previous appointment, was started on azelastine nasal spray in addition to allergy medicines.  States that dysphagia/postnasal drip has improved.  Denied any sore throat, dysphagia, fevers, chills, lymphadenopathy.    Diabetes; currently on insulin 60 units daily, Trulicity 4.5 mg weekly, states that blood glucose has been trolled.  Denied any polyphagia, polydipsia, polyuria.  Up-to-date on eye and foot exams, no acute complaints today.    There is no problem list on file for this patient.    Vitals:    Vitals:    07/26/22 0941   BP: 142/82   BP Location: Right arm   Patient Position: Sitting   Cuff Size: Large Adult   Pulse: 62   Temp: 97.1 °F (36.2 °C)   SpO2: 94%   Weight: (!) 140 kg (309 lb)   Height: 170.2 cm (67\")     Body mass index is 48.4 kg/m².      Current Outpatient Medications:   •  albuterol sulfate  (90 Base) MCG/ACT inhaler, Inhale 2 puffs Every 4 (Four) Hours As Needed for Wheezing or Shortness of Air., Disp: 18 g, Rfl: 3  •  azelastine (ASTELIN) 0.1 % nasal spray, 2 sprays into the nostril(s) as directed by provider 2 (Two) Times a Day. Use in each nostril as directed, Disp: 30 mL, Rfl: 12  •  carvedilol (COREG) 25 MG tablet, Take 1 tablet by mouth 2 (Two) Times a Day With Meals., Disp: 180 tablet, Rfl: 1  •  chlorthalidone (HYGROTEN) 50 MG tablet, Take 1 tablet by mouth Daily., Disp: 90 tablet, Rfl: 1  •  Dulaglutide 4.5 MG/0.5ML solution pen-injector, Inject 0.5 mL under the skin into the appropriate area as directed 1 (One) " Time Per Week., Disp: 4 pen, Rfl: 1  •  gabapentin (NEURONTIN) 800 MG tablet, Take 1 tablet by mouth 3 (Three) Times a Day., Disp: 90 tablet, Rfl: 2  •  glucose blood test strip, Use daily and as need up to TID, Disp: 100 each, Rfl: 5  •  glucose monitor monitoring kit, Use daily and as need up to TID, Disp: 1 each, Rfl: 0  •  hydrALAZINE (APRESOLINE) 50 MG tablet, Take 1 tablet by mouth 3 (Three) Times a Day., Disp: 180 tablet, Rfl: 2  •  insulin glargine (Lantus) 100 UNIT/ML injection, Inject 60 Units under the skin into the appropriate area as directed Every Morning Before Breakfast., Disp: 18 mL, Rfl: 1  •  Insulin Syringes, Disposable, U-100 0.3 ML misc, 1 each Daily., Disp: 100 each, Rfl: 1  •  Lancets (freestyle) lancets, Use daily and as need up to TID, Disp: 100 each, Rfl: 5  •  losartan (Cozaar) 50 MG tablet, Take 1 tablet by mouth Daily., Disp: 90 tablet, Rfl: 1  •  montelukast (Singulair) 10 MG tablet, Take 1 tablet by mouth Every Night., Disp: 90 tablet, Rfl: 3  •  NIFEdipine XL (PROCARDIA XL) 60 MG 24 hr tablet, Take 1 tablet by mouth Daily., Disp: 90 tablet, Rfl: 1  •  Omega-3 Fatty Acids (fish oil) 1000 MG capsule capsule, Take 1 capsule by mouth 2 (Two) Times a Day With Meals., Disp: 90 capsule, Rfl: 1  •  omeprazole (priLOSEC) 40 MG capsule, Take 1 capsule by mouth Daily., Disp: 90 capsule, Rfl: 1  •  potassium chloride ER (K-TAB) 20 MEQ tablet controlled-release ER tablet, Take 1 tablet by mouth 2 (Two) Times a Day., Disp: 120 tablet, Rfl: 1  •  rosuvastatin (CRESTOR) 40 MG tablet, Take 0.5 tablets by mouth 3 (Three) Times a Week., Disp: 90 tablet, Rfl: 1  •  sildenafil (Viagra) 100 MG tablet, Take 1 tablet by mouth Daily As Needed for Erectile Dysfunction., Disp: 15 tablet, Rfl: 5  •  spironolactone (ALDACTONE) 100 MG tablet, Take 1 tablet by mouth Daily., Disp: 90 tablet, Rfl: 1  •  vitamin B-12 (CYANOCOBALAMIN) 500 MCG tablet, Take 1 tablet by mouth Daily., Disp: 90 tablet, Rfl: 1  •  vitamin D  (ERGOCALCIFEROL) 1.25 MG (92329 UT) capsule capsule, Take 50,000 Units by mouth 1 (One) Time Per Week., Disp: , Rfl:     There is no problem list on file for this patient.    Past Surgical History:   Procedure Laterality Date   • APPENDECTOMY     • [facility]  [facility]   • HERNIA REPAIR Left      Social History     Socioeconomic History   • Marital status:    Tobacco Use   • Smoking status: Former Smoker   • Smokeless tobacco: Never Used   Vaping Use   • Vaping Use: Never used   Substance and Sexual Activity   • Alcohol use: Not Currently   • Drug use: Never   • Sexual activity: Not Currently     Family History   Problem Relation Age of Onset   • Diabetes Father    • Hypertension Father    • Heart disease Father    • Diabetes Paternal Grandfather      Office Visit on 03/07/2022   Component Date Value Ref Range Status   • Hemoglobin A1C 03/07/2022 8.5  % Final      US Thyroid  Narrative: EXAM DESCRIPTION:  US THYROID 9/10/2021 2:00 PM CDT    [facility]: US THYROID     CLINICAL HISTORY:  65 years Male, left sided neck pain with swallowing., M54.2  Cervicalgia    COMPARISON:  None.    TECHNIQUE:  Real-time grayscale color Doppler images were obtained through  the thyroid gland.  37 images.    FINDINGS:  The right lobe of thyroid gland measures 1.9 x 2 x 5.8 cm.  The right lobe of thyroid gland is minimally heterogeneous.  Color Doppler imaging demonstrates flow to the right lobe of the  thyroid gland.  No focal lesions are perceived within the right lobe of thyroid  gland.    The left lobe of thyroid gland measures 2.2 x 2.6 x 5.5 cm.  The left lobe of thyroid gland is homogeneous.  Color Doppler imaging suggests flow to left lobe of thyroid  gland.    The thyroid isthmus is homogeneous and normal 7 mm.  A left neck lymph node is present within the region of concern.  This lymph node appears normal, and measures 6 x 11 x 13 mm.  Impression: Normal    Electronically signed by:  Higinio Franco MD  9/12/2021 9:38 PM  CDT  Workstation: [id number]      [unfilled]  Immunization History   Administered Date(s) Administered   • COVID-19 (MODERNA) 1st, 2nd, 3rd Dose Only 02/24/2021, 03/24/2021, 11/02/2021, 04/07/2022   • Fluzone High-Dose 65+yrs 09/30/2021   • Shingrix 12/07/2019, 06/07/2020   • Tdap 12/07/2019     The following portions of the patient's history were reviewed and updated as appropriate: allergies, current medications, past family history, past medical history, past social history, past surgical history and problem list.    PHQ-9 Total Score:           Physical Exam  Constitutional:       General: He is not in acute distress.  HENT:      Head: Normocephalic and atraumatic.   Cardiovascular:      Rate and Rhythm: Normal rate and regular rhythm.      Heart sounds: Normal heart sounds. No murmur heard.  Pulmonary:      Effort: Pulmonary effort is normal.      Breath sounds: Normal breath sounds.   Abdominal:      Palpations: Abdomen is soft.      Tenderness: There is no abdominal tenderness.   Musculoskeletal:         General: No swelling.      Right lower leg: No edema.      Left lower leg: No edema.   Skin:     Coloration: Skin is not jaundiced.      Findings: No rash.   Neurological:      Mental Status: He is alert and oriented to person, place, and time. Mental status is at baseline.   Psychiatric:         Mood and Affect: Mood normal.         Behavior: Behavior normal.       Assessment & Plan    Diagnosis Plan   1. Resistant hypertension  carvedilol (COREG) 25 MG tablet    chlorthalidone (HYGROTEN) 50 MG tablet    hydrALAZINE (APRESOLINE) 50 MG tablet    NIFEdipine XL (PROCARDIA XL) 60 MG 24 hr tablet    losartan (Cozaar) 50 MG tablet    spironolactone (ALDACTONE) 100 MG tablet    CBC & Differential    Comprehensive Metabolic Panel   2. Obstructive sleep apnea syndrome  Ambulatory Referral to Sleep Medicine   3. Type 2 diabetes mellitus with hyperglycemia, with long-term current use of insulin (HCC)  POC Glycated  Hemoglobin, Total    Dulaglutide 4.5 MG/0.5ML solution pen-injector    Lipid Panel    TSH Rfx On Abnormal To Free T4   4. Mild intermittent asthma without complication  albuterol sulfate  (90 Base) MCG/ACT inhaler   5. Type 2 diabetes mellitus with diabetic neuropathy, with long-term current use of insulin (HCC)  gabapentin (NEURONTIN) 800 MG tablet   6. Gastroesophageal reflux disease, unspecified whether esophagitis present  omeprazole (priLOSEC) 40 MG capsule   7. Hypokalemia  potassium chloride ER (K-TAB) 20 MEQ tablet controlled-release ER tablet      Orders Placed This Encounter   Procedures   • Comprehensive Metabolic Panel     Order Specific Question:   Release to patient     Answer:   Immediate   • Lipid Panel   • TSH Rfx On Abnormal To Free T4     Order Specific Question:   Release to patient     Answer:   Immediate   • Ambulatory Referral to Sleep Medicine     Referral Priority:   Routine     Number of Visits Requested:   1   • POC Glycated Hemoglobin, Total     Order Specific Question:   Release to patient     Answer:   Immediate   • CBC & Differential     Order Specific Question:   Manual Differential     Answer:   No     HTN; resistant, likely due to uncontrolled sleep apnea, has not seen sleep doctor in 8 years. Encourage adherence to CPAP, will refer to sleep for further evaluation, possible adjustment.     Diabetes; uncontrolled but improved from previous, has only been on increased dose of Trulicity for 1 month, will continue with current medications, follow-up in 3 months for repeat A1c or sooner if needed.    Postnasal drip; improved with current management, reassuring, likely allergic component to PND.  Patient has appropriate follow-up, no red flags on exam.          This document has been electronically signed by Rafat Lin MD on July 26, 2022 10:32 CDT    EMR Dragon/Transciption Disclaimer: Some of this note may be an electronic transcription/translation of spoken language to printed  text.  The electronic translation of spoken language may permit erroneous, or at times, nonsensical words or phrases to be inadvertently transcribed. Although I have reviewed the note for such errors, some may still exist.

## 2022-07-27 DIAGNOSIS — I10 PRIMARY HYPERTENSION: Primary | ICD-10-CM

## 2022-07-27 DIAGNOSIS — I10 RESISTANT HYPERTENSION: ICD-10-CM

## 2022-07-27 RX ORDER — CHLORTHALIDONE 25 MG/1
25 TABLET ORAL DAILY
Start: 2022-07-27

## 2022-08-01 ENCOUNTER — OFFICE VISIT (OUTPATIENT)
Dept: SLEEP MEDICINE | Facility: HOSPITAL | Age: 67
End: 2022-08-01

## 2022-08-01 VITALS
DIASTOLIC BLOOD PRESSURE: 98 MMHG | OXYGEN SATURATION: 96 % | SYSTOLIC BLOOD PRESSURE: 182 MMHG | BODY MASS INDEX: 48.18 KG/M2 | HEART RATE: 85 BPM | HEIGHT: 67 IN | WEIGHT: 307 LBS

## 2022-08-01 DIAGNOSIS — R06.83 SNORING: ICD-10-CM

## 2022-08-01 DIAGNOSIS — G47.19 EXCESSIVE DAYTIME SLEEPINESS: ICD-10-CM

## 2022-08-01 DIAGNOSIS — E66.01 MORBID (SEVERE) OBESITY DUE TO EXCESS CALORIES: ICD-10-CM

## 2022-08-01 DIAGNOSIS — Z11.59 SCREENING FOR VIRAL DISEASE: Primary | ICD-10-CM

## 2022-08-01 DIAGNOSIS — R06.81 WITNESSED EPISODE OF APNEA: ICD-10-CM

## 2022-08-01 PROCEDURE — 99203 OFFICE O/P NEW LOW 30 MIN: CPT | Performed by: NURSE PRACTITIONER

## 2022-08-01 NOTE — PROGRESS NOTES
New Patient Sleep Medicine Consultation    Encounter Date: 8/1/2022         Patient's PCP: Rafat Lin MD  Referring provider: Rafat Lin MD  Reason for consultation chief complaint: snoring, loud disruptive snoring, awakening gasping for breath, witnessed apneas, excessive daytime sleepiness, unrefreshing sleep and insomnia    Azael Bray is a 66 y.o. male whose bedtime is ~ varies. He  States sleep latency varies but sometimes takes up to 120 minutes, and is up 5 times per night.Up to the bathroom frequently. Able to fall back asleep quickly.  He wakes up ~ varies. He endorses 2 hours of sleep. He drinks 1 cups of coffee, 2 teas, and 1 sodas per day. He drinks 0 alcoholic beverages per week.      Azael Bray admits to snoring, unrestful sleep, High blod pressure, decreased libido, excessive daytime sleepiness, stop breathing during sleep, sleeping less than 6 hours per night, Disturbed or restless sleep, Up to the bathroom at night, difficulty falling asleep and difficulty staying asleep for >5 years. He denies cataplexy, sleep paralysis, or hypnagogic hallucinations. He takes no medicine for sleep. He has no sleepiness with driving. He naps  Most days ~ time and frequent varies. Some days will nap multiple times.  He has never had a sleep study. He sleeps in a bed alone. Wife sleeps in different bed due to his snoring.     States has had a CPAP for ~ 20 years but never had a sleep study. Has difficulty using CPAP. Takes mask off in the middle of the night without realizing it. Sporadically used for the last 20 years, but has not used in almost 1 year. He has tried several different types of masks. Nasal and FFM.   [facility]     Sleep study history:   1. He has never had a sleep study     PAP Data:  No data   Mask type: Full face mask  [facility]      He used to smoke, but has not smoked for years. Smoking history: smoked 0-1 ppds from age 21 until 21      Marital status:  "   Occupation: retired from teaching   Children: 3  FH of sleep disorders: not that he knows of       Past Medical History:   Diagnosis Date   • Asthma    • Carpal tunnel syndrome    • CHF (congestive heart failure) (HCC)    • Diabetes mellitus (HCC)    • Hypertension    • PTSD (post-traumatic stress disorder)    • Sleep apnea      Social History     Socioeconomic History   • Marital status:    Tobacco Use   • Smoking status: Former Smoker   • Smokeless tobacco: Never Used   Vaping Use   • Vaping Use: Never used   Substance and Sexual Activity   • Alcohol use: Not Currently   • Drug use: Never   • Sexual activity: Not Currently     Family History   Problem Relation Age of Onset   • Diabetes Father    • Hypertension Father    • Heart disease Father    • Diabetes Paternal Grandfather          Review of Systems:  Constitutional: negative  Eyes: negative  Ears, nose, mouth, throat, and face: negative  Respiratory: negative  Cardiovascular: negative  Gastrointestinal: negative  Genitourinary:negative  Integument/breast: negative  Hematologic/lymphatic: negative  Musculoskeletal:negative  Neurological: negative  Behavioral/Psych: negative  Endocrine: negative  Allergic/Immunologic: negative Patient advised to discuss any positive ROS with PCP.      [place] - 19      Vitals:    08/01/22 0921   BP: (!) 182/98   Pulse: 85   SpO2: 96%           08/01/22 0921   Weight: (!) 139 kg (307 lb)       Body mass index is 48.07 kg/m². Class 3 Severe Obesity (BMI >=40). Obesity-related health conditions include the following: obstructive sleep apnea and hypertension. Obesity is unchanged. BMI is is above average; BMI management plan is completed. We discussed portion control and increasing exercise.      Neck circumference: 18.5\"          General: Alert. Cooperative. Well developed. No acute distress.             Head:  Normocephalic. Symmetrical. Atraumatic.              Eyes: Sclera clear. No icterus. Normal EOM.        "      Ears: No deformities. Normal hearing.             Nose: No septal deviation. No drainage.          Throat: No oral lesions. No thrush. Moist mucous membranes. Trachea midline    Tongue is enlarged    Dentition is fair       Pharynx: Posterior pharyngeal pillars are narrow    Mallampati score of IV (only hard palate visible)    Pharynx is nonerythematous   Chest Wall:  Normal shape. Symmetric expansion with respiration. No tenderness.          Lungs:  Clear to auscultation bilaterally. No wheezes. No rhonchi. No rales. Respirations regular, even and unlabored.            Heart:  Regular rhythm and normal rate. Normal S1 and S2. No murmur.  Extremities:  Moves all extremities well. No edema.               Skin: Dry. Intact. No bleeding. No rash.           Neuro: Moves all 4 extremities and cranial nerves grossly intact.  Psychiatric: Normal mood and affect.      Current Outpatient Medications:   •  albuterol sulfate  (90 Base) MCG/ACT inhaler, Inhale 2 puffs Every 4 (Four) Hours As Needed for Wheezing or Shortness of Air., Disp: 18 g, Rfl: 3  •  azelastine (ASTELIN) 0.1 % nasal spray, 2 sprays into the nostril(s) as directed by provider 2 (Two) Times a Day. Use in each nostril as directed, Disp: 30 mL, Rfl: 12  •  carvedilol (COREG) 25 MG tablet, Take 1 tablet by mouth 2 (Two) Times a Day With Meals., Disp: 180 tablet, Rfl: 1  •  chlorthalidone (HYGROTON) 25 MG tablet, Take 1 tablet by mouth Daily., Disp: , Rfl:   •  Dulaglutide 4.5 MG/0.5ML solution pen-injector, Inject 0.5 mL under the skin into the appropriate area as directed 1 (One) Time Per Week., Disp: 4 pen, Rfl: 1  •  gabapentin (NEURONTIN) 800 MG tablet, Take 1 tablet by mouth 3 (Three) Times a Day., Disp: 90 tablet, Rfl: 2  •  glucose blood test strip, Use daily and as need up to TID, Disp: 100 each, Rfl: 5  •  glucose monitor monitoring kit, Use daily and as need up to TID, Disp: 1 each, Rfl: 0  •  hydrALAZINE (APRESOLINE) 50 MG tablet, Take 1  tablet by mouth 3 (Three) Times a Day., Disp: 180 tablet, Rfl: 2  •  insulin glargine (Lantus) 100 UNIT/ML injection, Inject 60 Units under the skin into the appropriate area as directed Every Morning Before Breakfast., Disp: 18 mL, Rfl: 1  •  Insulin Syringes, Disposable, U-100 0.3 ML misc, 1 each Daily., Disp: 100 each, Rfl: 1  •  Lancets (freestyle) lancets, Use daily and as need up to TID, Disp: 100 each, Rfl: 5  •  losartan (Cozaar) 50 MG tablet, Take 1 tablet by mouth Daily., Disp: 90 tablet, Rfl: 1  •  montelukast (Singulair) 10 MG tablet, Take 1 tablet by mouth Every Night., Disp: 90 tablet, Rfl: 3  •  NIFEdipine XL (PROCARDIA XL) 60 MG 24 hr tablet, Take 1 tablet by mouth Daily., Disp: 90 tablet, Rfl: 1  •  Omega-3 Fatty Acids (fish oil) 1000 MG capsule capsule, Take 1 capsule by mouth 2 (Two) Times a Day With Meals., Disp: 90 capsule, Rfl: 1  •  omeprazole (priLOSEC) 40 MG capsule, Take 1 capsule by mouth Daily., Disp: 90 capsule, Rfl: 1  •  potassium chloride ER (K-TAB) 20 MEQ tablet controlled-release ER tablet, Take 1 tablet by mouth 2 (Two) Times a Day., Disp: 120 tablet, Rfl: 1  •  rosuvastatin (CRESTOR) 40 MG tablet, Take 0.5 tablets by mouth 3 (Three) Times a Week., Disp: 90 tablet, Rfl: 1  •  sildenafil (Viagra) 100 MG tablet, Take 1 tablet by mouth Daily As Needed for Erectile Dysfunction., Disp: 15 tablet, Rfl: 5  •  spironolactone (ALDACTONE) 100 MG tablet, Take 1 tablet by mouth Daily., Disp: 90 tablet, Rfl: 1  •  vitamin B-12 (CYANOCOBALAMIN) 500 MCG tablet, Take 1 tablet by mouth Daily., Disp: 90 tablet, Rfl: 1  •  vitamin D (ERGOCALCIFEROL) 1.25 MG (12624 UT) capsule capsule, Take 50,000 Units by mouth 1 (One) Time Per Week., Disp: , Rfl:     Lab Results   Component Value Date    WBC 9.37 07/26/2022    HGB 15.1 07/26/2022    HCT 46.5 07/26/2022    MCV 76.9 (L) 07/26/2022     07/26/2022     Lab Results   Component Value Date    GLUCOSE 100 (H) 07/26/2022    CALCIUM 9.6 07/26/2022    NA  140 07/26/2022    K 3.4 (L) 07/26/2022    CO2 30.9 (H) 07/26/2022    CL 95 (L) 07/26/2022    BUN 26 (H) 07/26/2022    CREATININE 1.51 (H) 07/26/2022    EGFRIFAFRI 63 10/26/2021    BCR 17.2 07/26/2022    ANIONGAP 14.1 07/26/2022     No results found for: INR, PROTIME  No results found for: CKTOTAL, CKMB, CKMBINDEX, TROPONINI, TROPONINT    No results found for: PHART, HHV4OJY, PO2ART]    Contraindications to home sleep test: Hypoventilation and coronary artery disease     Assessment and Plan:    1. Excessive daytime sleepiness-   1. Check Split night PSG  2. COVID test prior to study   3. Sleep education referral once patient studied   4. [place] sleep/wake times, limit napping to once daily, no longer than 45 min not after 2PM  5. Close monitoring of compliance report   6. Drowsy driving tips- do not drive if feeling sleepy   7. RTC in 2 weeks with study results   2.   Snoring- New to me, additional work-up planned    1.   As above   3.   Frequent nocturnal awakening   4.   Witnessed apnea   5.   Poor sleep hygiene   6. Morbid obesity       An in lab PAP titration study has been ordered. The patient was educated regarding the recent safety recall of [employer] CPAP/BiPAP machines, and the continued usage of these machines at [facility]. The patient was informed of the safety precautions being used to decrease potential risks of machine usage during the titration study, including: in-line filter usage, and the implementation of single-use, disposable masks and tubing. While these steps do not completely eliminate the risk of potential exposure to particulates and emissions from the machine's sound absorbing foam, the health risks of untreated or poorly controlled sleep apnea outweigh the potential risk of particulate or emission exposure. The patient has made a well-informed decision to proceed with scheduling testing and has also been given additional printed safety information  for their own viewing purposes.      I obtained a brief history from the patient, reviewed the medical problems and current medications, and made medical decisions regarding treatment based on that information.   I spent 30 minutes caring for Azael on this date of service. This time includes time spent by me in the following activities: preparing for the visit, obtaining and/or reviewing a separately obtained history, performing a medically appropriate examination and/or evaluation, counseling and educating the patient/family/caregiver, ordering medications, tests, or procedures and documenting information in the medical record.  I answered all of his wife and patient's questions. They verbalized understanding. Discussion involving sleep apnea and health impact of sleep apnea.            RTC 2 weeks after study for results.             This document has been electronically signed by LORENZO Bell on August 1, 2022 09:28 CDT          This document has been electronically signed by LORENZO Bell on August 1, 2022         CC: Rafat Lin MD Moody, Leo James, MD

## 2022-08-10 ENCOUNTER — TELEPHONE (OUTPATIENT)
Dept: FAMILY MEDICINE CLINIC | Facility: CLINIC | Age: 67
End: 2022-08-10

## 2022-08-24 ENCOUNTER — HOSPITAL ENCOUNTER (OUTPATIENT)
Dept: SLEEP MEDICINE | Facility: HOSPITAL | Age: 67
Discharge: HOME | End: 2022-08-24
Admitting: NURSE PRACTITIONER

## 2022-08-24 VITALS — BODY MASS INDEX: 47.24 KG/M2 | HEIGHT: 67 IN | WEIGHT: 301 LBS

## 2022-08-24 DIAGNOSIS — G47.19 EXCESSIVE DAYTIME SLEEPINESS: ICD-10-CM

## 2022-08-24 DIAGNOSIS — R06.83 SNORING: ICD-10-CM

## 2022-08-24 DIAGNOSIS — E66.01 MORBID (SEVERE) OBESITY DUE TO EXCESS CALORIES: ICD-10-CM

## 2022-08-24 DIAGNOSIS — R06.81 WITNESSED EPISODE OF APNEA: ICD-10-CM

## 2022-08-24 PROCEDURE — 95811 POLYSOM 6/>YRS CPAP 4/> PARM: CPT

## 2022-08-24 PROCEDURE — 95811 POLYSOM 6/>YRS CPAP 4/> PARM: CPT | Performed by: PSYCHIATRY & NEUROLOGY

## 2022-09-14 DIAGNOSIS — G47.33 OSA (OBSTRUCTIVE SLEEP APNEA): Primary | ICD-10-CM

## 2022-09-14 DIAGNOSIS — E66.2 OBESITY HYPOVENTILATION SYNDROME: ICD-10-CM

## 2022-09-14 DIAGNOSIS — I50.30 HEART FAILURE WITH PRESERVED EJECTION FRACTION, UNSPECIFIED HF CHRONICITY: ICD-10-CM

## 2022-09-14 DIAGNOSIS — G47.34 NOCTURNAL HYPOXIA: ICD-10-CM

## 2022-09-19 ENCOUNTER — OFFICE VISIT (OUTPATIENT)
Dept: SLEEP MEDICINE | Facility: HOSPITAL | Age: 67
End: 2022-09-19

## 2022-09-19 ENCOUNTER — TELEPHONE (OUTPATIENT)
Dept: FAMILY MEDICINE CLINIC | Facility: CLINIC | Age: 67
End: 2022-09-19

## 2022-09-19 VITALS
OXYGEN SATURATION: 97 % | DIASTOLIC BLOOD PRESSURE: 91 MMHG | SYSTOLIC BLOOD PRESSURE: 179 MMHG | WEIGHT: 304.7 LBS | HEART RATE: 75 BPM | HEIGHT: 67 IN | BODY MASS INDEX: 47.82 KG/M2

## 2022-09-19 DIAGNOSIS — F51.04 PSYCHOPHYSIOLOGICAL INSOMNIA: ICD-10-CM

## 2022-09-19 DIAGNOSIS — G47.33 OSA (OBSTRUCTIVE SLEEP APNEA): Primary | ICD-10-CM

## 2022-09-19 DIAGNOSIS — G47.34 NOCTURNAL HYPOXIA: ICD-10-CM

## 2022-09-19 PROCEDURE — 99214 OFFICE O/P EST MOD 30 MIN: CPT | Performed by: NURSE PRACTITIONER

## 2022-09-19 RX ORDER — TRAZODONE HYDROCHLORIDE 50 MG/1
50 TABLET ORAL NIGHTLY
Qty: 30 TABLET | Refills: 1 | Status: SHIPPED | OUTPATIENT
Start: 2022-09-19 | End: 2022-11-07 | Stop reason: SDUPTHER

## 2022-09-19 NOTE — PROGRESS NOTES
Sleep Clinic Follow-Up    CHIEF COMPLAINT: follow up sleep testing    LAST OV: 08/01/2022 (I reviewed this note)    HPI:  The patient is a 66 y.o. male.  I discussed the results of the recent split night polysomnography performed on 08/24/2022.  Diagnostic portion included overall AHI of 55.8. Mean O2 of 88% with a linda of 81%. Poor sleep efficiency. PLMI of no significance. Average heart rate of 73 BPM. Started on CPAP. Increased to 15 cm h2O overall AHI of 0 but he only slept 15 minutes. No REM sleep. 2 LPM O2 added into PAP due to persistent O2 of < 88%. Good recovery with oxygen.     INTERVAL MEDICAL HISTORY: No change since last office visit in regard to the patient's bedtime routine, medications, or diagnosis.    States he is up a lot during the night to urinate. Sometimes 8-10 times per night.     PAP Data:  Currently not on therapy           MEDICATIONS:   Current Outpatient Medications:   •  albuterol sulfate  (90 Base) MCG/ACT inhaler, Inhale 2 puffs Every 4 (Four) Hours As Needed for Wheezing or Shortness of Air., Disp: 18 g, Rfl: 3  •  azelastine (ASTELIN) 0.1 % nasal spray, 2 sprays into the nostril(s) as directed by provider 2 (Two) Times a Day. Use in each nostril as directed, Disp: 30 mL, Rfl: 12  •  carvedilol (COREG) 25 MG tablet, Take 1 tablet by mouth 2 (Two) Times a Day With Meals., Disp: 180 tablet, Rfl: 1  •  chlorthalidone (HYGROTON) 25 MG tablet, Take 1 tablet by mouth Daily., Disp: , Rfl:   •  Dulaglutide 4.5 MG/0.5ML solution pen-injector, Inject 0.5 mL under the skin into the appropriate area as directed 1 (One) Time Per Week., Disp: 4 pen, Rfl: 1  •  gabapentin (NEURONTIN) 800 MG tablet, Take 1 tablet by mouth 3 (Three) Times a Day., Disp: 90 tablet, Rfl: 2  •  glucose blood test strip, Use daily and as need up to TID, Disp: 100 each, Rfl: 5  •  glucose monitor monitoring kit, Use daily and as need up to TID, Disp: 1 each, Rfl: 0  •  hydrALAZINE (APRESOLINE) 50 MG tablet, Take 1  tablet by mouth 3 (Three) Times a Day., Disp: 180 tablet, Rfl: 2  •  insulin glargine (Lantus) 100 UNIT/ML injection, Inject 60 Units under the skin into the appropriate area as directed Every Morning Before Breakfast., Disp: 18 mL, Rfl: 1  •  Insulin Syringes, Disposable, U-100 0.3 ML misc, 1 each Daily., Disp: 100 each, Rfl: 1  •  Lancets (freestyle) lancets, Use daily and as need up to TID, Disp: 100 each, Rfl: 5  •  losartan (Cozaar) 50 MG tablet, Take 1 tablet by mouth Daily., Disp: 90 tablet, Rfl: 1  •  montelukast (Singulair) 10 MG tablet, Take 1 tablet by mouth Every Night., Disp: 90 tablet, Rfl: 3  •  NIFEdipine XL (PROCARDIA XL) 60 MG 24 hr tablet, Take 1 tablet by mouth Daily., Disp: 90 tablet, Rfl: 1  •  Omega-3 Fatty Acids (fish oil) 1000 MG capsule capsule, Take 1 capsule by mouth 2 (Two) Times a Day With Meals., Disp: 90 capsule, Rfl: 1  •  omeprazole (priLOSEC) 40 MG capsule, Take 1 capsule by mouth Daily., Disp: 90 capsule, Rfl: 1  •  potassium chloride ER (K-TAB) 20 MEQ tablet controlled-release ER tablet, Take 1 tablet by mouth 2 (Two) Times a Day., Disp: 120 tablet, Rfl: 1  •  rosuvastatin (CRESTOR) 40 MG tablet, Take 0.5 tablets by mouth 3 (Three) Times a Week., Disp: 90 tablet, Rfl: 1  •  sildenafil (Viagra) 100 MG tablet, Take 1 tablet by mouth Daily As Needed for Erectile Dysfunction., Disp: 15 tablet, Rfl: 5  •  spironolactone (ALDACTONE) 100 MG tablet, Take 1 tablet by mouth Daily., Disp: 90 tablet, Rfl: 1  •  vitamin B-12 (CYANOCOBALAMIN) 500 MCG tablet, Take 1 tablet by mouth Daily., Disp: 90 tablet, Rfl: 1  •  vitamin D (ERGOCALCIFEROL) 1.25 MG (42195 UT) capsule capsule, Take 50,000 Units by mouth 1 (One) Time Per Week., Disp: , Rfl:     PHYSICAL EXAM  Vitals:    09/19/22 0920   BP: 179/91   Pulse: 75   SpO2: 97%           09/19/22  0920   Weight: (!) 138 kg (304 lb 11.2 oz)       Body mass index is 47.71 kg/m². Class 3 Severe Obesity (BMI >=40). Obesity-related health conditions  include the following: obstructive sleep apnea. Obesity is unchanged. BMI is is above average; BMI management plan is completed. We discussed portion control and increasing exercise.      Gen:  No acute distress heard in voice, alert, oriented  Eyes:    Moist conjunctiva, EOMI   Lungs:  Normal effort, non-labored breathing, clear to auscultation bilaterally   Heart:  Normal S1/S2, no murmur , no lower extremity edema   Psych:  Appropriate affect   Neuro:  Cn2-12 appear intact     Assessment and Plan:    1. Severe Obstructive sleep apnea -  1. Start on CPAP at 15 cm H2O with mask fitting per DME and PAP supplies   2. DME: [facility]   3. Continue PAP as prescribed.   4. Monitor compliance report closely, low threshold for repeat titration   5. Referral to sleep educator if difficulty adjusting or needs mask fitting   6. CPAP desensitization   7. Drowsy driving tips- do not drive if feeling sleepy   8. Return to clinic in 4 weeks with compliance report, or sooner if needed   2. Morbid obesity   3. Insomnia - sleep onset and or maintenance   1. Start trazodone 50 mg nightly- educated on usage, dosage and possible side effects   2. Good sleep hygiene   4. Nocturnal hypoxia   1. Start 2 LPM O2 bled into PAP during sleep       The sleep results were discussed in detail with the patient.  The risks of untreated sleep apnea were reviewed.  Recommended repeat CPAP/BIPAP titration with sleep aid due to poor sleep efficiency. He does not want to come back in for repeat study. States he does not think he will be able to sleep well regardless. Discussed benefit of titration study. Offered CPAP with close monitoring as alternative. He is agreeable. Will start patient on CPAP at 15 cm H2O with 2 LPM O2 bled into PAP.  Close compliance report monitoring. Low threshold for repeat titration study. Discussed cpap desensitization and will start on trazodone while adjusting to therapy. Consider referral to sleep education if  difficulty adjusting.  I counseled patient on PAP therapy, sleep hygiene, including regular sleep wake schedule and stimulus control therapy, the importance of weight reduction, safe driving and abstaining from smoking and alcohol consumption, as well as other sedatives.       All of the patient's questions were answered. He states understanding and agreement with my assessment and plan as above.     I obtained a brief history from the patient, reviewed the medical problems and current medications, and made medical decisions regarding treatment based on that information. Total visit time 30 min, with total of 20 minutes spent counseling patient regarding: PAP therapy, PAP compliance, PAP maintenance, Weight loss, Lifestyle modifications, Sleep hygiene, Medication changes, Study results and Further testing.       Patient to follow up in 31-90 days with compliance report   He agrees to return sooner on a prn basis if sx change.           This document has been electronically signed by LORENZO Bell on September 19, 2022 09:22 CDT            CC: Rafat Lin MD          No ref. provider found

## 2022-09-19 NOTE — TELEPHONE ENCOUNTER
Patient had called and left a voicemail. Called patient back no answer. Left voicemail for him to call our office back

## 2022-09-21 ENCOUNTER — CLINICAL SUPPORT (OUTPATIENT)
Dept: FAMILY MEDICINE CLINIC | Facility: CLINIC | Age: 67
End: 2022-09-21

## 2022-09-21 DIAGNOSIS — Z23 FLU VACCINE NEED: Primary | ICD-10-CM

## 2022-09-21 PROCEDURE — G0008 ADMIN INFLUENZA VIRUS VAC: HCPCS | Performed by: NURSE PRACTITIONER

## 2022-09-21 PROCEDURE — 90662 IIV NO PRSV INCREASED AG IM: CPT | Performed by: NURSE PRACTITIONER

## 2022-10-17 DIAGNOSIS — E87.6 HYPOKALEMIA: ICD-10-CM

## 2022-10-17 NOTE — TELEPHONE ENCOUNTER
Rx Refill Note  Requested Prescriptions     Pending Prescriptions Disp Refills   • potassium chloride ER (K-TAB) 20 MEQ tablet controlled-release ER tablet 120 tablet 1     Sig: Take 1 tablet by mouth 2 (Two) Times a Day.      Last office visit with prescribing clinician: 7/26/2022      Next office visit with prescribing clinician: 10/27/2022            Haley Shah MA  10/17/22, 12:22 CDT

## 2022-10-19 RX ORDER — POTASSIUM CHLORIDE 1500 MG/1
20 TABLET, FILM COATED, EXTENDED RELEASE ORAL 2 TIMES DAILY
Qty: 120 TABLET | Refills: 1 | Status: SHIPPED | OUTPATIENT
Start: 2022-10-19 | End: 2023-01-27 | Stop reason: SDUPTHER

## 2022-10-21 ENCOUNTER — OFFICE VISIT (OUTPATIENT)
Dept: OTOLARYNGOLOGY | Facility: CLINIC | Age: 67
End: 2022-10-21

## 2022-10-21 VITALS — HEIGHT: 68 IN | WEIGHT: 308.8 LBS | OXYGEN SATURATION: 96 % | BODY MASS INDEX: 46.8 KG/M2

## 2022-10-21 DIAGNOSIS — K21.9 LARYNGOPHARYNGEAL REFLUX (LPR): ICD-10-CM

## 2022-10-21 DIAGNOSIS — K21.9 GASTROESOPHAGEAL REFLUX DISEASE, UNSPECIFIED WHETHER ESOPHAGITIS PRESENT: ICD-10-CM

## 2022-10-21 DIAGNOSIS — R09.89 GLOBUS SENSATION: Primary | ICD-10-CM

## 2022-10-21 PROCEDURE — 99204 OFFICE O/P NEW MOD 45 MIN: CPT | Performed by: OTOLARYNGOLOGY

## 2022-10-21 PROCEDURE — 31575 DIAGNOSTIC LARYNGOSCOPY: CPT | Performed by: OTOLARYNGOLOGY

## 2022-10-21 RX ORDER — SUCRALFATE ORAL 1 G/10ML
1 SUSPENSION ORAL 2 TIMES DAILY
Qty: 600 ML | Refills: 5 | Status: SHIPPED | OUTPATIENT
Start: 2022-10-21 | End: 2023-03-17 | Stop reason: SDUPTHER

## 2022-10-21 RX ORDER — OMEPRAZOLE 40 MG/1
40 CAPSULE, DELAYED RELEASE ORAL DAILY
Qty: 90 CAPSULE | Refills: 1 | Status: SHIPPED | OUTPATIENT
Start: 2022-10-21 | End: 2023-01-27 | Stop reason: SDUPTHER

## 2022-10-24 NOTE — PROGRESS NOTES
Subjective   Azael Bray Sr. is a 67 y.o. male.       History of Present Illness   Patient says he feels like he has something in the left side of his throat that he cannot expectorate and cannot swallow.  Been this way for years and he has seen multiple physicians.  Frequently clears his throat.  Denies hemoptysis.  Is tobacco abstinent.  Takes omeprazole but still has breakthrough reflux symptoms at times.      The following portions of the patient's history were reviewed and updated as appropriate: allergies, current medications, past family history, past medical history, past social history, past surgical history and problem list.     reports that he has quit smoking. He has never used smokeless tobacco. He reports that he does not currently use alcohol. He reports that he does not use drugs.   Patient is not a tobacco user and has not been counseled for use of tobacco products      Review of Systems        Objective   Physical Exam  General: Male no acute distress.  Voice is mildly harsh no breathiness or stridor.  Clears his throat frequently during the exam  Ears: External ears no deformity, canals no discharge, tympanic membranes intact clear and mobile bilaterally.  Nares no discharge or purulence  Oral cavity: Lips and gums without lesions.  Tongue and floor of mouth without lesions.  Parotid and submandibular ducts unobstructed.  No mucosal lesions on the buccal mucosa or vestibule of the mouth.  Pharynx: Tonsils absent no erythema exudate or mass  Neck: No lymphadenopathy.  No thyromegaly.  Trachea and larynx midline.  No masses in the parotid or submandibular glands.  Procedure Note    Pre-operative Diagnosis: Patient presents with:  Difficulty Swallowing       Post-operative Diagnosis: Chronic laryngitis    Anesthesia: Topical with Xylocaine and Laurent-Synephrine    Endoscopy Type:  Flexible Laryngoscopy    Procedure Details:    The patient was placed in the sitting position.  After topical  anesthesia and decongestion, the 4 mm laryngoscope was passed.  The nasal cavities, nasopharynx, oropharynx, hypopharynx, and larynx were all examined.  Vocal cords were examined during respiration and phonation.  The following findings were noted:    Findings: Previously noted nasal findings were confirmed. Nasopharynx without mass, hypopharynx and larynx without evidence of neoplasm. Vocal cord mobility intact. There is chronic appearing edema and erythema of the laryngeal structures consistent with chronic laryngitis.    Condition:  Stable.  Patient tolerated procedure well.    Complications:  None         Assessment and Plan   Diagnoses and all orders for this visit:    1. Globus sensation (Primary)    2. Laryngopharyngeal reflux (LPR)    3. Gastroesophageal reflux disease, unspecified whether esophagitis present  -     omeprazole (priLOSEC) 40 MG capsule; Take 1 capsule by mouth Daily.  Dispense: 90 capsule; Refill: 1    Other orders  -     sucralfate (Carafate) 1 GM/10ML suspension; Take 10 mL by mouth 2 (Two) Times a Day.  Dispense: 600 mL; Refill: 5             Plan: Explained globus sensation to the patient.  We will refill his omeprazole and add Carafate.  Advise dietary measures to help minimize reflux.  Return in 3 months, call sooner for problems.

## 2022-10-27 ENCOUNTER — LAB (OUTPATIENT)
Dept: LAB | Facility: HOSPITAL | Age: 67
End: 2022-10-27

## 2022-10-27 ENCOUNTER — OFFICE VISIT (OUTPATIENT)
Dept: FAMILY MEDICINE CLINIC | Facility: CLINIC | Age: 67
End: 2022-10-27

## 2022-10-27 VITALS
SYSTOLIC BLOOD PRESSURE: 158 MMHG | BODY MASS INDEX: 47.13 KG/M2 | TEMPERATURE: 97.1 F | WEIGHT: 311 LBS | OXYGEN SATURATION: 96 % | DIASTOLIC BLOOD PRESSURE: 70 MMHG | HEIGHT: 68 IN | HEART RATE: 71 BPM

## 2022-10-27 DIAGNOSIS — Z79.4 TYPE 2 DIABETES MELLITUS WITH HYPERGLYCEMIA, WITH LONG-TERM CURRENT USE OF INSULIN: Primary | ICD-10-CM

## 2022-10-27 DIAGNOSIS — E55.9 VITAMIN D DEFICIENCY: ICD-10-CM

## 2022-10-27 DIAGNOSIS — E11.65 TYPE 2 DIABETES MELLITUS WITH HYPERGLYCEMIA, WITH LONG-TERM CURRENT USE OF INSULIN: Primary | ICD-10-CM

## 2022-10-27 LAB
EXPIRATION DATE: ABNORMAL
HBA1C MFR BLD: 7.7 %
Lab: ABNORMAL

## 2022-10-27 PROCEDURE — 83036 HEMOGLOBIN GLYCOSYLATED A1C: CPT | Performed by: STUDENT IN AN ORGANIZED HEALTH CARE EDUCATION/TRAINING PROGRAM

## 2022-10-27 PROCEDURE — 82306 VITAMIN D 25 HYDROXY: CPT | Performed by: STUDENT IN AN ORGANIZED HEALTH CARE EDUCATION/TRAINING PROGRAM

## 2022-10-27 PROCEDURE — 36415 COLL VENOUS BLD VENIPUNCTURE: CPT | Performed by: STUDENT IN AN ORGANIZED HEALTH CARE EDUCATION/TRAINING PROGRAM

## 2022-10-27 PROCEDURE — 80048 BASIC METABOLIC PNL TOTAL CA: CPT | Performed by: STUDENT IN AN ORGANIZED HEALTH CARE EDUCATION/TRAINING PROGRAM

## 2022-10-27 PROCEDURE — 99213 OFFICE O/P EST LOW 20 MIN: CPT | Performed by: STUDENT IN AN ORGANIZED HEALTH CARE EDUCATION/TRAINING PROGRAM

## 2022-10-27 PROCEDURE — 36416 COLLJ CAPILLARY BLOOD SPEC: CPT | Performed by: STUDENT IN AN ORGANIZED HEALTH CARE EDUCATION/TRAINING PROGRAM

## 2022-10-27 RX ORDER — ERGOCALCIFEROL 1.25 MG/1
50000 CAPSULE ORAL WEEKLY
Qty: 5 CAPSULE | Refills: 1 | Status: SHIPPED | OUTPATIENT
Start: 2022-10-27 | End: 2023-01-27 | Stop reason: SDUPTHER

## 2022-10-27 RX ORDER — INSULIN GLARGINE 100 [IU]/ML
65 INJECTION, SOLUTION SUBCUTANEOUS
Qty: 18 ML | Refills: 1 | Status: SHIPPED | OUTPATIENT
Start: 2022-10-27

## 2022-10-28 LAB
25(OH)D3 SERPL-MCNC: 41.4 NG/ML (ref 30–100)
ANION GAP SERPL CALCULATED.3IONS-SCNC: 10.3 MMOL/L (ref 5–15)
BUN SERPL-MCNC: 25 MG/DL (ref 8–23)
BUN/CREAT SERPL: 17.2 (ref 7–25)
CALCIUM SPEC-SCNC: 9.7 MG/DL (ref 8.6–10.5)
CHLORIDE SERPL-SCNC: 96 MMOL/L (ref 98–107)
CO2 SERPL-SCNC: 30.7 MMOL/L (ref 22–29)
CREAT SERPL-MCNC: 1.45 MG/DL (ref 0.76–1.27)
EGFRCR SERPLBLD CKD-EPI 2021: 52.5 ML/MIN/1.73
GLUCOSE SERPL-MCNC: 187 MG/DL (ref 65–99)
POTASSIUM SERPL-SCNC: 3.4 MMOL/L (ref 3.5–5.2)
SODIUM SERPL-SCNC: 137 MMOL/L (ref 136–145)

## 2022-11-07 ENCOUNTER — OFFICE VISIT (OUTPATIENT)
Dept: SLEEP MEDICINE | Facility: CLINIC | Age: 67
End: 2022-11-07

## 2022-11-07 VITALS
WEIGHT: 311 LBS | BODY MASS INDEX: 47.13 KG/M2 | HEART RATE: 102 BPM | SYSTOLIC BLOOD PRESSURE: 136 MMHG | OXYGEN SATURATION: 98 % | HEIGHT: 68 IN | DIASTOLIC BLOOD PRESSURE: 82 MMHG

## 2022-11-07 DIAGNOSIS — F51.04 PSYCHOPHYSIOLOGICAL INSOMNIA: ICD-10-CM

## 2022-11-07 DIAGNOSIS — G47.33 OSA (OBSTRUCTIVE SLEEP APNEA): Primary | ICD-10-CM

## 2022-11-07 PROCEDURE — 99213 OFFICE O/P EST LOW 20 MIN: CPT | Performed by: NURSE PRACTITIONER

## 2022-11-07 RX ORDER — TRAZODONE HYDROCHLORIDE 50 MG/1
50 TABLET ORAL NIGHTLY
Qty: 30 TABLET | Refills: 5 | Status: SHIPPED | OUTPATIENT
Start: 2022-11-07 | End: 2023-01-27 | Stop reason: SDUPTHER

## 2022-11-07 NOTE — PROGRESS NOTES
Sleep Clinic Follow Up    Date: 2022  Primary Care Provider: Rafat Lin MD    Last office visit: 2022 (I reviewed this note)    CC: Follow up: JOSEPH started on CPAP with oxygen, 31-90 day       Interim History:  Since the last visit:    1) severe JOSEPH -  Azael Bray Sr. has remained compliant with CPAP. He denies mask and machine issues, dry mouth, headaches. He denies abnormal dreams, sleep paralysis, nasal congestion, URI sx.  Since starting on CPAP he reports less daytime sleepiness. Memory is better. Able to focus better. Blood pressure improved.   Pressure seems too high some nights. Swallowing air.     2) Patient denies RLS symptoms.    Recently  got trazodone in the mail. Has only taken twice. Seems to have helped decrease sleep onset. He has trouble falling and staying asleep. Sleep and wake times vary. He naps 1-2 hours most days.     Sleep Testin. Split night PSG on 2022, AHI of 55.8   2. CPAP titration , recommended 15 cm H2O with 2 LPM O2 bled into PAP  3. Currently on 15 cm H2O with 2 LPM O2 bled into PAP    PAP Data:    Time frame: 2022-10/27/2022   Compliance: 97 %  Average use on days used: 4hrs 50 min  Percent of days with usage greater than or equal to 4 hours: 71%  PAP range: 15 cm H2O  Average 90% pressure: 15 cmH2O  Leak: 35 L/ minutes  Average AHI: 2.5 events/hr  Mask type: Full face mask  DME: [facility]     Bed time: varies  Sleep latency: varies, 30-45 min   Number of times awakens during the night: varies   Wake time: varies   Estimated total sleep time at night: varies (4-5) hours  Caffeine intake: 0-1 cups of coffee, 1 cups of tea, and 0-1 sodas per day  Alcohol intake: 0 drinks per week  Nap time: most days 1-2 hours     Sleepiness with Driving: denies     [place] - 13, 20 prior to CPAP        PMHx, FH, SH reviewed and pertinent changes are: reports unchanged from last office visit on 2022      REVIEW OF SYSTEMS:   Negative for  chest pain, SOA, fever, chills, cough, N/V/D, abdominal pain.    Smoking:none, former          Exam:  Vitals:    11/07/22 1311   BP: 136/82   Pulse: 102   SpO2: 98%           11/07/22  1311   Weight: (!) 141 kg (311 lb)     Body mass index is 47.29 kg/m². Class 3 Severe Obesity (BMI >=40). Obesity-related health conditions include the following: obstructive sleep apnea and hypertension. Obesity is unchanged. BMI is is above average; BMI management plan is completed. We discussed portion control and increasing exercise.      Gen:                No distress, conversant, pleasant, appears stated age, alert, oriented  Eyes:               Anicteric sclera, moist conjunctiva, no lid lag                           EOMI   Lungs:             normal effort, non-labored breathing                          Clear to auscultation bilaterally          CV:                  Normal S1/S2, no murmur                          no lower extremity edema                 Psych:             Appropriate affect  Neuro:             CN 2-12 appear intact    Past Medical History:   Diagnosis Date   • Asthma    • Carpal tunnel syndrome    • CHF (congestive heart failure) (HCC)    • Diabetes mellitus (HCC)    • Dizziness medications   • HL (hearing loss)    • Hypertension    • PTSD (post-traumatic stress disorder)    • Sleep apnea        Current Outpatient Medications:   •  albuterol sulfate  (90 Base) MCG/ACT inhaler, Inhale 2 puffs Every 4 (Four) Hours As Needed for Wheezing or Shortness of Air., Disp: 18 g, Rfl: 3  •  azelastine (ASTELIN) 0.1 % nasal spray, 2 sprays into the nostril(s) as directed by provider 2 (Two) Times a Day. Use in each nostril as directed, Disp: 30 mL, Rfl: 12  •  carvedilol (COREG) 25 MG tablet, Take 1 tablet by mouth 2 (Two) Times a Day With Meals., Disp: 180 tablet, Rfl: 1  •  chlorthalidone (HYGROTON) 25 MG tablet, Take 1 tablet by mouth Daily., Disp: , Rfl:   •  Dulaglutide 4.5 MG/0.5ML solution pen-injector,  Inject 0.5 mL under the skin into the appropriate area as directed 1 (One) Time Per Week., Disp: 4 pen, Rfl: 1  •  gabapentin (NEURONTIN) 800 MG tablet, Take 1 tablet by mouth 3 (Three) Times a Day., Disp: 90 tablet, Rfl: 2  •  glucose blood test strip, Use daily and as need up to TID, Disp: 100 each, Rfl: 5  •  glucose monitor monitoring kit, Use daily and as need up to TID, Disp: 1 each, Rfl: 0  •  hydrALAZINE (APRESOLINE) 50 MG tablet, Take 1 tablet by mouth 3 (Three) Times a Day., Disp: 180 tablet, Rfl: 2  •  insulin glargine (Lantus) 100 UNIT/ML injection, Inject 65 Units under the skin into the appropriate area as directed Every Morning Before Breakfast., Disp: 18 mL, Rfl: 1  •  Insulin Syringes, Disposable, U-100 0.3 ML misc, 1 each Daily., Disp: 100 each, Rfl: 1  •  Lancets (freestyle) lancets, Use daily and as need up to TID, Disp: 100 each, Rfl: 5  •  losartan (Cozaar) 50 MG tablet, Take 1 tablet by mouth Daily., Disp: 90 tablet, Rfl: 1  •  montelukast (Singulair) 10 MG tablet, Take 1 tablet by mouth Every Night., Disp: 90 tablet, Rfl: 3  •  NIFEdipine XL (PROCARDIA XL) 60 MG 24 hr tablet, Take 1 tablet by mouth Daily., Disp: 90 tablet, Rfl: 1  •  omeprazole (priLOSEC) 40 MG capsule, Take 1 capsule by mouth Daily., Disp: 90 capsule, Rfl: 1  •  potassium chloride ER (K-TAB) 20 MEQ tablet controlled-release ER tablet, Take 1 tablet by mouth 2 (Two) Times a Day., Disp: 120 tablet, Rfl: 1  •  rosuvastatin (CRESTOR) 40 MG tablet, Take 0.5 tablets by mouth 3 (Three) Times a Week., Disp: 90 tablet, Rfl: 1  •  sildenafil (Viagra) 100 MG tablet, Take 1 tablet by mouth Daily As Needed for Erectile Dysfunction., Disp: 15 tablet, Rfl: 5  •  spironolactone (ALDACTONE) 100 MG tablet, Take 1 tablet by mouth Daily., Disp: 90 tablet, Rfl: 1  •  sucralfate (Carafate) 1 GM/10ML suspension, Take 10 mL by mouth 2 (Two) Times a Day., Disp: 600 mL, Rfl: 5  •  traZODone (DESYREL) 50 MG tablet, Take 1 tablet by mouth Every Night.,  Disp: 30 tablet, Rfl: 1  •  vitamin B-12 (CYANOCOBALAMIN) 500 MCG tablet, Take 1 tablet by mouth Daily., Disp: 90 tablet, Rfl: 1  •  vitamin D (ERGOCALCIFEROL) 1.25 MG (44137 UT) capsule capsule, Take 1 capsule by mouth 1 (One) Time Per Week., Disp: 5 capsule, Rfl: 1      Assessment and Plan:    1. Obstructive sleep apnea    1. Compliant with PAP therapy  2. Pressure change to 13 cm H2O for comfort   3. Continue PAP as prescribed  4. Script for PAP supplies  5. Drowsy driving tips- do not drive if feeling sleepy   6. Return to clinic in 6 months with compliance report unless change in symptoms in interim period  2. Morbid obesity   3. Insomnia - sleep onset and or maintenance -  1. [place] sleep and wake times, limit napping to no longer than 30 min and no later than 2PM  2. Good sleep hygiene   3. Refill trazodone 50 mg nightly   4. Nocturnal hypoxia   1. Continue 2 LPM O2 bled into PAP during sleep           I spent 20 minutes caring for Azael on this date of service. This time includes time spent by me in the following activities: preparing for the visit, obtaining and/or reviewing a separately obtained history, performing a medically appropriate examination and/or evaluation, counseling and educating the patient/family/caregiver, ordering medications, tests, or procedures and documenting information in the medical record.           This document has been electronically signed by LORENZO Bell on November 7, 2022 13:34 CST            CC: Rafat Lin MD          No ref. provider found

## 2022-11-17 ENCOUNTER — TELEPHONE (OUTPATIENT)
Dept: FAMILY MEDICINE CLINIC | Facility: CLINIC | Age: 67
End: 2022-11-17

## 2022-11-17 NOTE — TELEPHONE ENCOUNTER
Vitamin D levels normal, recommend decreasing supplementation to every other week.  Kidney function still slightly elevated however improved from previous.    Called pt since he hasn't seen his [employer] message yet and let him know what Dr Lin said about his lab results. He voiced understanding.

## 2022-12-27 DIAGNOSIS — E11.65 TYPE 2 DIABETES MELLITUS WITH HYPERGLYCEMIA, WITH LONG-TERM CURRENT USE OF INSULIN: ICD-10-CM

## 2022-12-27 DIAGNOSIS — Z79.4 TYPE 2 DIABETES MELLITUS WITH HYPERGLYCEMIA, WITH LONG-TERM CURRENT USE OF INSULIN: ICD-10-CM

## 2022-12-27 NOTE — TELEPHONE ENCOUNTER
Rx Refill Note  Requested Prescriptions     Pending Prescriptions Disp Refills   • Dulaglutide 4.5 MG/0.5ML solution pen-injector       Sig: Inject 0.5 mL under the skin into the appropriate area as directed 1 (One) Time Per Week.      Last office visit with prescribing clinician: 10/27/2022     Next office visit with prescribing clinician: 1/27/2023                         Haley Shah MA  12/27/22, 12:28 CST

## 2022-12-27 NOTE — TELEPHONE ENCOUNTER
Patient called asking for a new script for a refill on his [facility]icity to [facility].  Call back number: [phone]

## 2023-01-27 ENCOUNTER — OFFICE VISIT (OUTPATIENT)
Dept: FAMILY MEDICINE CLINIC | Facility: CLINIC | Age: 68
End: 2023-01-27
Payer: MEDICARE

## 2023-01-27 VITALS
OXYGEN SATURATION: 96 % | WEIGHT: 315 LBS | HEART RATE: 55 BPM | HEIGHT: 68 IN | DIASTOLIC BLOOD PRESSURE: 94 MMHG | BODY MASS INDEX: 47.74 KG/M2 | SYSTOLIC BLOOD PRESSURE: 138 MMHG

## 2023-01-27 DIAGNOSIS — E11.65 TYPE 2 DIABETES MELLITUS WITH HYPERGLYCEMIA, WITH LONG-TERM CURRENT USE OF INSULIN: Primary | ICD-10-CM

## 2023-01-27 DIAGNOSIS — Z79.4 TYPE 2 DIABETES MELLITUS WITH HYPERGLYCEMIA, WITH LONG-TERM CURRENT USE OF INSULIN: Primary | ICD-10-CM

## 2023-01-27 DIAGNOSIS — J45.20 MILD INTERMITTENT ASTHMA WITHOUT COMPLICATION: ICD-10-CM

## 2023-01-27 DIAGNOSIS — R09.82 PND (POST-NASAL DRIP): ICD-10-CM

## 2023-01-27 DIAGNOSIS — G47.00 INSOMNIA, UNSPECIFIED TYPE: ICD-10-CM

## 2023-01-27 DIAGNOSIS — E11.40 TYPE 2 DIABETES MELLITUS WITH DIABETIC NEUROPATHY, WITH LONG-TERM CURRENT USE OF INSULIN: ICD-10-CM

## 2023-01-27 DIAGNOSIS — I10 RESISTANT HYPERTENSION: ICD-10-CM

## 2023-01-27 DIAGNOSIS — N52.9 ERECTILE DYSFUNCTION, UNSPECIFIED ERECTILE DYSFUNCTION TYPE: ICD-10-CM

## 2023-01-27 DIAGNOSIS — Z79.4 TYPE 2 DIABETES MELLITUS WITH DIABETIC NEUROPATHY, WITH LONG-TERM CURRENT USE OF INSULIN: ICD-10-CM

## 2023-01-27 DIAGNOSIS — K21.9 GASTROESOPHAGEAL REFLUX DISEASE, UNSPECIFIED WHETHER ESOPHAGITIS PRESENT: ICD-10-CM

## 2023-01-27 DIAGNOSIS — E87.6 HYPOKALEMIA: ICD-10-CM

## 2023-01-27 DIAGNOSIS — E55.9 VITAMIN D DEFICIENCY: ICD-10-CM

## 2023-01-27 DIAGNOSIS — E78.5 HYPERLIPIDEMIA, UNSPECIFIED HYPERLIPIDEMIA TYPE: ICD-10-CM

## 2023-01-27 LAB
EXPIRATION DATE: NORMAL
HBA1C MFR BLD: 9 %
Lab: NORMAL

## 2023-01-27 PROCEDURE — 99214 OFFICE O/P EST MOD 30 MIN: CPT | Performed by: STUDENT IN AN ORGANIZED HEALTH CARE EDUCATION/TRAINING PROGRAM

## 2023-01-27 PROCEDURE — 83036 HEMOGLOBIN GLYCOSYLATED A1C: CPT | Performed by: STUDENT IN AN ORGANIZED HEALTH CARE EDUCATION/TRAINING PROGRAM

## 2023-01-27 PROCEDURE — 3052F HG A1C>EQUAL 8.0%<EQUAL 9.0%: CPT | Performed by: STUDENT IN AN ORGANIZED HEALTH CARE EDUCATION/TRAINING PROGRAM

## 2023-01-27 RX ORDER — TRAZODONE HYDROCHLORIDE 50 MG/1
50 TABLET ORAL NIGHTLY
Qty: 30 TABLET | Refills: 5 | Status: SHIPPED | OUTPATIENT
Start: 2023-01-27

## 2023-01-27 RX ORDER — OMEPRAZOLE 40 MG/1
40 CAPSULE, DELAYED RELEASE ORAL DAILY
Qty: 90 CAPSULE | Refills: 1 | Status: SHIPPED | OUTPATIENT
Start: 2023-01-27 | End: 2023-03-17 | Stop reason: SDUPTHER

## 2023-01-27 RX ORDER — ROSUVASTATIN CALCIUM 40 MG/1
20 TABLET, COATED ORAL 3 TIMES WEEKLY
Qty: 90 TABLET | Refills: 1 | Status: SHIPPED | OUTPATIENT
Start: 2023-01-27

## 2023-01-27 RX ORDER — CHOLECALCIFEROL (VITAMIN D3) 125 MCG
500 CAPSULE ORAL DAILY
Qty: 90 TABLET | Refills: 1 | Status: SHIPPED | OUTPATIENT
Start: 2023-01-27

## 2023-01-27 RX ORDER — HYDRALAZINE HYDROCHLORIDE 50 MG/1
50 TABLET, FILM COATED ORAL 3 TIMES DAILY
Qty: 180 TABLET | Refills: 2 | Status: SHIPPED | OUTPATIENT
Start: 2023-01-27

## 2023-01-27 RX ORDER — SILDENAFIL 100 MG/1
100 TABLET, FILM COATED ORAL DAILY PRN
Qty: 15 TABLET | Refills: 5 | Status: SHIPPED | OUTPATIENT
Start: 2023-01-27

## 2023-01-27 RX ORDER — NIFEDIPINE 60 MG/1
60 TABLET, EXTENDED RELEASE ORAL DAILY
Qty: 90 TABLET | Refills: 1 | Status: SHIPPED | OUTPATIENT
Start: 2023-01-27

## 2023-01-27 RX ORDER — GABAPENTIN 800 MG/1
800 TABLET ORAL 3 TIMES DAILY
Qty: 90 TABLET | Refills: 2 | Status: SHIPPED | OUTPATIENT
Start: 2023-01-27

## 2023-01-27 RX ORDER — ALBUTEROL SULFATE 90 UG/1
2 AEROSOL, METERED RESPIRATORY (INHALATION) EVERY 4 HOURS PRN
Qty: 18 G | Refills: 3 | Status: SHIPPED | OUTPATIENT
Start: 2023-01-27

## 2023-01-27 RX ORDER — AZELASTINE 1 MG/ML
2 SPRAY, METERED NASAL 2 TIMES DAILY
Qty: 30 ML | Refills: 12 | Status: SHIPPED | OUTPATIENT
Start: 2023-01-27 | End: 2023-02-16 | Stop reason: CLARIF

## 2023-01-27 RX ORDER — POTASSIUM CHLORIDE 1500 MG/1
20 TABLET, FILM COATED, EXTENDED RELEASE ORAL 2 TIMES DAILY
Qty: 120 TABLET | Refills: 1 | Status: SHIPPED | OUTPATIENT
Start: 2023-01-27

## 2023-01-27 RX ORDER — ERGOCALCIFEROL 1.25 MG/1
50000 CAPSULE ORAL WEEKLY
Qty: 5 CAPSULE | Refills: 1 | Status: SHIPPED | OUTPATIENT
Start: 2023-01-27

## 2023-01-27 RX ORDER — SPIRONOLACTONE 100 MG/1
100 TABLET, FILM COATED ORAL DAILY
Qty: 90 TABLET | Refills: 1 | Status: SHIPPED | OUTPATIENT
Start: 2023-01-27

## 2023-01-27 RX ORDER — LOSARTAN POTASSIUM 50 MG/1
50 TABLET ORAL DAILY
Qty: 90 TABLET | Refills: 1 | Status: SHIPPED | OUTPATIENT
Start: 2023-01-27

## 2023-01-27 NOTE — PROGRESS NOTES
"Subjective:  Azael Bray Sr. is a 67 y.o. male who presents for     Type 2 diabetes; currently on Trulicity 4.5 mg weekly, Lantus 65 units every morning. Denies any issues with medication, states that blood glucose has been elevated duet to holidays and decreased activity.  Denies any episodes of hypoglycemia, polyphagia, polydipsia, polyuria. Last eye exam was ~ 7 months ago, last foot exam was 3 months ago.  Additionally has neuropathy, currently on gabapentin 800 mg 3 times daily, states that gabapentin provides good relief, no adverse effects, no increased use, weakness, fatigue, falls, confusion, dizziness.    Hypertension; currently on Hydralazine 50 mg 3 times daily, chlorthalidone 25 mg daily, carvedilol 25 mg twice daily, losartan 50 mg daily, nifedipine XL 60 mg daily, spironolactone 100 mg daily. States that blood pressure has been well controlled.  Does not have any issues with medications. Denies any cardiac symptoms, chest pain, shortness of breath, headaches, vision changes, numbness, tingling, weakness, leg swelling, orthopnea, dyspnea on exertion.    There is no problem list on file for this patient.    Vitals:    Vitals:    01/27/23 0807   BP: 138/94   BP Location: Right arm   Pulse: 55   SpO2: 96%   Weight: (!) 145 kg (320 lb)   Height: 172.7 cm (68\")     Body mass index is 48.66 kg/m².      Current Outpatient Medications:   •  albuterol sulfate  (90 Base) MCG/ACT inhaler, Inhale 2 puffs Every 4 (Four) Hours As Needed for Wheezing or Shortness of Air., Disp: 18 g, Rfl: 3  •  azelastine (ASTELIN) 0.1 % nasal spray, 2 sprays into the nostril(s) as directed by provider 2 (Two) Times a Day. Use in each nostril as directed, Disp: 30 mL, Rfl: 12  •  carvedilol (COREG) 25 MG tablet, Take 1 tablet by mouth 2 (Two) Times a Day With Meals., Disp: 180 tablet, Rfl: 1  •  chlorthalidone (HYGROTON) 25 MG tablet, Take 1 tablet by mouth Daily., Disp: , Rfl:   •  Dulaglutide 4.5 MG/0.5ML solution " pen-injector, Inject 0.5 mL under the skin into the appropriate area as directed 1 (One) Time Per Week., Disp: 2 mL, Rfl: 3  •  gabapentin (NEURONTIN) 800 MG tablet, Take 1 tablet by mouth 3 (Three) Times a Day., Disp: 90 tablet, Rfl: 2  •  glucose blood test strip, Use daily and as need up to TID, Disp: 100 each, Rfl: 5  •  glucose monitor monitoring kit, Use daily and as need up to TID, Disp: 1 each, Rfl: 0  •  hydrALAZINE (APRESOLINE) 50 MG tablet, Take 1 tablet by mouth 3 (Three) Times a Day., Disp: 180 tablet, Rfl: 2  •  insulin glargine (Lantus) 100 UNIT/ML injection, Inject 65 Units under the skin into the appropriate area as directed Every Morning Before Breakfast., Disp: 18 mL, Rfl: 1  •  Insulin Syringes, Disposable, U-100 0.3 ML misc, 1 each Daily., Disp: 100 each, Rfl: 1  •  Lancets (freestyle) lancets, Use daily and as need up to TID, Disp: 100 each, Rfl: 5  •  losartan (Cozaar) 50 MG tablet, Take 1 tablet by mouth Daily., Disp: 90 tablet, Rfl: 1  •  montelukast (Singulair) 10 MG tablet, Take 1 tablet by mouth Every Night., Disp: 90 tablet, Rfl: 3  •  NIFEdipine XL (PROCARDIA XL) 60 MG 24 hr tablet, Take 1 tablet by mouth Daily., Disp: 90 tablet, Rfl: 1  •  omeprazole (priLOSEC) 40 MG capsule, Take 1 capsule by mouth Daily., Disp: 90 capsule, Rfl: 1  •  potassium chloride ER (K-TAB) 20 MEQ tablet controlled-release ER tablet, Take 1 tablet by mouth 2 (Two) Times a Day., Disp: 120 tablet, Rfl: 1  •  rosuvastatin (CRESTOR) 40 MG tablet, Take 0.5 tablets by mouth 3 (Three) Times a Week., Disp: 90 tablet, Rfl: 1  •  sildenafil (Viagra) 100 MG tablet, Take 1 tablet by mouth Daily As Needed for Erectile Dysfunction., Disp: 15 tablet, Rfl: 5  •  spironolactone (ALDACTONE) 100 MG tablet, Take 1 tablet by mouth Daily., Disp: 90 tablet, Rfl: 1  •  sucralfate (Carafate) 1 GM/10ML suspension, Take 10 mL by mouth 2 (Two) Times a Day., Disp: 600 mL, Rfl: 5  •  traZODone (DESYREL) 50 MG tablet, Take 1 tablet by mouth  Every Night., Disp: 30 tablet, Rfl: 5  •  vitamin B-12 (CYANOCOBALAMIN) 500 MCG tablet, Take 1 tablet by mouth Daily., Disp: 90 tablet, Rfl: 1  •  vitamin D (ERGOCALCIFEROL) 1.25 MG (98050 UT) capsule capsule, Take 1 capsule by mouth 1 (One) Time Per Week., Disp: 5 capsule, Rfl: 1  •  empagliflozin (Jardiance) 10 MG tablet tablet, Take 1 tablet by mouth Daily., Disp: 90 tablet, Rfl: 0    There is no problem list on file for this patient.    Past Surgical History:   Procedure Laterality Date   • APPENDECTOMY     • [facility]  [facility]   • HERNIA REPAIR Left      Social History     Socioeconomic History   • Marital status:    Tobacco Use   • Smoking status: Former   • Smokeless tobacco: Never   Vaping Use   • Vaping Use: Never used   Substance and Sexual Activity   • Alcohol use: Not Currently   • Drug use: Never   • Sexual activity: Not Currently     Family History   Problem Relation Age of Onset   • Diabetes Father    • Hypertension Father    • Heart disease Father    • Diabetes Paternal Grandfather      Office Visit on 10/27/2022   Component Date Value Ref Range Status   • Hemoglobin A1C 10/27/2022 7.7 (H)  % Final   • Lot Number 10/27/2022 [id number]   Final   • Expiration Date 10/27/2022 1/2,025   Final   • 25 Hydroxy, Vitamin D 10/27/2022 41.4  30.0 - 100.0 ng/ml Final      Polysomnography 4 or More Parameters  [facility]  [address] [place] [address]  Phone: [phone]  Fax: [phone]    Split Night Polysomnography Interpretation    Patient's Name: Azael Bray Sr.   YOB: 1955  Date of Study: 24 Aug 2022  Referring provider: LORENZO Luis  Primary Care Physician: Rafat Lin MD    History Of Present Illness:  Mr. Azael Bray Sr. is a 66 y.o. male with a Body mass index is   47.14 kg/m².  Concurrent medical history includes Obesity w/ elevated   bicarb of 31, DM, HTN, PTSD, CHF/HFpEF, obstructive  sleep apnea.  Prior   Echo in 2019 showing LVEF 6%; moderately dilated RV w/ nml fx.  Concurrent   medications include Coreg, Neurontin, Losartan, Insulin, Statin,   Aldactone.  Patient presents for a polysomnogram for complaints of JOSEPH,   excessive daytime sleepiness.     Polysomnography:  The patient's [facility]  Sleep was monitored in accordance with   recommended AASM guidelines.  The recording also included oral/nasal   airflow, chest and abdominal respiratory effort, nasal pressure, single   channel EKG, intercostal EMG, bilateral tibialis EMG, and oxygen   saturation (by pulse oximeter).    Technical Comments:  Technically adequate study.    DIAGNOSTIC/BASELINE PORTION:    EEG & Sleep Architecture:  --Sleep onset occurred after 36 minutes, unremarkable.   --Sleep efficiency was 35%, significantly reduced, normal is >80% in age   65 and less.    --Total sleep time was 72 minutes.    --Wake after sleep onset was 101 minutes.   --Sleep architecture:   -->  NREM noted.    -->  No REM observed.    --There was no evidence of parasomnias (night terrors, sleepwalking, etc.)   on study night.   --No noted EEG abnormalities.     Respiratory:  --Snoring was noted during the study (for 05% of total sleep time).  --Supine sleep was not observed.  --The overall apnea-hypopnea index (AHI) was 56 / hr which includes only   apneas and hypopneas with 4% desaturations noted.    --OAHI 56 / hr.    --EAGLE 00 / hr.    --REM AHI: n/a.    --Supine AHI: n/a.  --Mean oxygen saturation asleep was 88%.  The oxygen linda was 81%.    --The patient spent 44 minutes, or 61% of total sleep time with   saturations less than 89%.   --Sustained hypoxia was observed during the study for > 5 minutes.    Arousals:   --The overall arousal index was 32 / hr.     Cardiac/EKG:   --Normal sinus rhythm with ectopy: relatively frequent narrow complex   beats (consistent with PACs) and more  occasional wide complex beats   (consistent with PACs as well as couplets and bigeminy) observed.    --Mean heart rate of 74 bpm.  Range of 46 bpm to 84 bpm.      EMG / Periodic Limb Movements:    --The periodic limb movement index was 00 / hr with 00 / hr leading to   arousal.    --Expected index is < 15 / hr.         THERAPEUTIC/TREATMENT PORTION:    EEG & Sleep Architecture:  -No noted EEG abnormalities.   -There was no evidence of parasomnias (night terrors, sleepwalking, etc.)   on study night.   -Sleep onset occurred after 35 minutes, unremarkable.   -Sleep efficiency was 31%, effectively unchanged.    -Total sleep time was 108 minutes.    -Wake after sleep onset was 206 minutes.   --Sleep architecture:   -->  NREM and REM sleep were noted.     -->  One cycle of sleep were observed.      Respiratory:  -Supine sleep was observed for 04% of the study.      -CPAP was titrated from a pressure of 10cm to 15cm:    • At 10 cm: NREM lateral with poor sleep  • At 12-14 cm: NREM lateral with ongoing events, sustained hypoxia; REM   lateral with ongoing events.  2 LPM added at 13 cm to good effect and   resolution of sustained hypoxia.  • At 15 cm: NERM lateral, brief, with improved control    -No REM sleep supine.     -Interfaces Used: Not specified; expected leak controlled and stable in   40s - 50s LPM.    Cardiac/EKG:   --Normal sinus rhythm with ectopy: relatively frequent narrow complex   beats (consistent with PACs) and more occasional wide complex beats   (consistent with PACs as well as couplets and a triplet) observed of   unclear clinical significance.   --Mean heart rate of 64 bpm.  Range of 45 bpm to 76 bpm.      EMG / Periodic Limb Movements:    --The periodic limb movement index was 00 / hr with 00 / hr leading to   arousal.    --Expected index is < 15 / hr.         Study Conditions:    --Head of the bed: flat   --Supplemental oxygen: 2 LPM at epoch 1009   --Medications Used on the Night of the Study: none  "specified    Subjective:   --The post sleep study questionnaire indicated the following:   --> \"How refreshing was your sleep last night?\":  \"Not at all\"   --> \"How satisfactory was your sleep last night?\":  \"Not at all\"    Assessment & Recommendations:  Mr. Azael Bray Sr. is a 66 y.o.   male who underwent split night polysomnography.      The diagnostic portion revealed:   1. Severe Obstructive Sleep Apnea  / Sleep Disordered Breathing, with an   AHI of 56 / hr.  2. Limited total sleep time    3. Sustained nocturnal hypoxia in context of obesity w/ elevated bicarb of   31.    During the therapeutic/treatment portion, undergoing positive pressure   titration, a wide range of pressures were tested.     An adequate titration occurred per AASM guidelines, limited by lack of REM   supine and complicated by ongoing sustained nocturnal hypoxia in context   of obesity w/ elevated bicarb of 31 needing supplemental O2.  No ideal   pressure identified for REM lateral.  NREM lateral with fair control at 15   cm but limited time.      Cardiac findings as noted above.  Recommend continued follow-up with Dr. Lin.     Moving forward, recommend in lab BiPAP titration given obesity related   hypoventilation concerns and sub optimal control on CPAP at 15 cm with 2   LPM O2.  Consideration for sleep aid on study night.  Recommend clinic   follow-up (as currently scheduled) to discuss results and treatment plan.    Call with any questions or concerns.     Ziggy Richard II, MD  Sleep Medicine  09/14/22 @ 07:43 CDT    CC: Rafat Lin MD Caron, Sydney, APRN      @[email or web address]  Immunization History   Administered Date(s) Administered   • COVID-19 (MODERNA) 1st, 2nd, 3rd Dose Only 02/24/2021, 03/24/2021, 11/02/2021, 04/07/2022   • COVID-19 (PFIZER) BIVALENT BOOSTER 12+YRS 09/22/2022   • Fluzone High-Dose 65+yrs 09/30/2021, 09/21/2022   • Shingrix 12/07/2019, 06/07/2020   • Tdap 12/07/2019     The following " portions of the patient's history were reviewed and updated as appropriate: allergies, current medications, past family history, past medical history, past social history, past surgical history and problem list.    PHQ-9 Total Score:         Lab 01/27/23  0830   HEMOGLOBIN A1C 9.0         Physical Exam  Constitutional:       General: He is not in acute distress.     Appearance: He is obese.   HENT:      Head: Normocephalic and atraumatic.   Cardiovascular:      Rate and Rhythm: Normal rate and regular rhythm.      Heart sounds: Normal heart sounds. No murmur heard.  Pulmonary:      Effort: Pulmonary effort is normal.      Breath sounds: Normal breath sounds.   Abdominal:      Palpations: Abdomen is soft.      Tenderness: There is no abdominal tenderness.   Musculoskeletal:         General: No swelling.      Right lower leg: No edema.      Left lower leg: No edema.   Skin:     Coloration: Skin is not jaundiced.      Findings: No rash.   Neurological:      Mental Status: He is alert and oriented to person, place, and time. Mental status is at baseline.   Psychiatric:         Mood and Affect: Mood normal.         Behavior: Behavior normal.       Assessment & Plan    Diagnosis Plan   1. Type 2 diabetes mellitus with hyperglycemia, with long-term current use of insulin ([facility])  POC Glycated Hemoglobin, Total    Dulaglutide 4.5 MG/0.5ML solution pen-injector    empagliflozin (Jardiance) 10 MG tablet tablet      2. Mild intermittent asthma without complication  albuterol sulfate  (90 Base) MCG/ACT inhaler      3. PND (post-nasal drip)  azelastine (ASTELIN) 0.1 % nasal spray      4. Vitamin D deficiency  vitamin D (ERGOCALCIFEROL) 1.25 MG (72291 UT) capsule capsule      5. Type 2 diabetes mellitus with diabetic neuropathy, with long-term current use of insulin ([facility])  gabapentin (NEURONTIN) 800 MG tablet      6. Resistant hypertension  hydrALAZINE (APRESOLINE) 50 MG tablet    losartan (Cozaar) 50 MG tablet    NIFEdipine  XL (PROCARDIA XL) 60 MG 24 hr tablet    spironolactone (ALDACTONE) 100 MG tablet      7. Gastroesophageal reflux disease, unspecified whether esophagitis present  omeprazole (priLOSEC) 40 MG capsule      8. Hypokalemia  potassium chloride ER (K-TAB) 20 MEQ tablet controlled-release ER tablet      9. Hyperlipidemia, unspecified hyperlipidemia type  rosuvastatin (CRESTOR) 40 MG tablet      10. Erectile dysfunction, unspecified erectile dysfunction type  sildenafil (Viagra) 100 MG tablet      11. Insomnia, unspecified type  traZODone (DESYREL) 50 MG tablet         Orders Placed This Encounter   Procedures   • POC Glycated Hemoglobin, Total     Type 2 diabetes; denied issues with current current medications, A1c today 9.0%, concerning.  Continue to encourage diet, exercise, weight loss, up-to-date on eye and foot exams. On ACE/ARB.  After discussion with patient, will add on jardiance 10mg daily. Also on gabapentin for neuropathy, denied any issues with medication, no signs of abuse/misuse, Chris reviewed and appropriate, UDS up to date, will refill. Follow-up in 3 months or sooner if needed.    Hypertension; does not have issues with current medications, blood pressure has been well controlled.  Discussed importance of blood pressure control, risk of uncontrolled hypertension, continue to work on diet, exercise, weight loss.  After discussion, will continue current management.  Follow-up in 3 month with blood pressure log or sooner if needed.  Given strict ER/cardiac precautions.     Additional medication refills as above, denies adverse effects, labs up-to-date, providing good relief, will refill.      This document has been electronically signed by Rafat Lin MD on January 27, 2023 08:46 CST    EMR Dragon/Transciption Disclaimer: Some of this note may be an electronic transcription/translation of spoken language to printed text.  The electronic translation of spoken language may permit erroneous, or at times,  nonsensical words or phrases to be inadvertently transcribed. Although I have reviewed the note for such errors, some may still exist.

## 2023-02-08 ENCOUNTER — TELEPHONE (OUTPATIENT)
Dept: FAMILY MEDICINE CLINIC | Facility: CLINIC | Age: 68
End: 2023-02-08
Payer: OTHER GOVERNMENT

## 2023-02-08 NOTE — TELEPHONE ENCOUNTER
Patient called saying that Dr. Lin had prescribed a new medication empagliflozin (Jardiance) 10 MG tablet tablet  And that he hasnt received it yet. Told the patient that [facility]. [facility]. Patient said that he would call them to see and then let us know so Dr. Lin can resend it if need be patients number is [phone]

## 2023-02-16 ENCOUNTER — TELEPHONE (OUTPATIENT)
Dept: FAMILY MEDICINE CLINIC | Facility: CLINIC | Age: 68
End: 2023-02-16
Payer: OTHER GOVERNMENT

## 2023-02-16 RX ORDER — FLUTICASONE PROPIONATE 50 MCG
2 SPRAY, SUSPENSION (ML) NASAL DAILY
Qty: 18.2 ML | Refills: 11 | Status: SHIPPED | OUTPATIENT
Start: 2023-02-16

## 2023-02-16 NOTE — TELEPHONE ENCOUNTER
[facility] regarding pt Azelastine nasal spray that is not covered by [facility].  The preferred medicine is fluticasone.  Ok to switch to the preferred med per v.o. Dr Lin/VIET Kincaid.

## 2023-03-17 ENCOUNTER — OFFICE VISIT (OUTPATIENT)
Dept: OTOLARYNGOLOGY | Facility: CLINIC | Age: 68
End: 2023-03-17
Payer: OTHER GOVERNMENT

## 2023-03-17 VITALS — TEMPERATURE: 97.1 F | WEIGHT: 314 LBS | HEIGHT: 68 IN | BODY MASS INDEX: 47.59 KG/M2

## 2023-03-17 DIAGNOSIS — K21.9 GASTROESOPHAGEAL REFLUX DISEASE, UNSPECIFIED WHETHER ESOPHAGITIS PRESENT: ICD-10-CM

## 2023-03-17 DIAGNOSIS — K21.9 LARYNGOPHARYNGEAL REFLUX (LPR): ICD-10-CM

## 2023-03-17 DIAGNOSIS — R09.89 GLOBUS SENSATION: Primary | ICD-10-CM

## 2023-03-17 PROCEDURE — 99214 OFFICE O/P EST MOD 30 MIN: CPT | Performed by: OTOLARYNGOLOGY

## 2023-03-17 RX ORDER — SUCRALFATE ORAL 1 G/10ML
1 SUSPENSION ORAL 2 TIMES DAILY
Qty: 600 ML | Refills: 5 | Status: SHIPPED | OUTPATIENT
Start: 2023-03-17

## 2023-03-17 RX ORDER — OMEPRAZOLE 40 MG/1
40 CAPSULE, DELAYED RELEASE ORAL DAILY
Qty: 90 CAPSULE | Refills: 1 | Status: SHIPPED | OUTPATIENT
Start: 2023-03-17

## 2023-03-17 NOTE — PROGRESS NOTES
Subjective   Azael Bray Sr. is a 67 y.o. male.       History of Present Illness     Patient was seen previously with globus sensation thought to be due to laryngal pharyngeal reflux.  He was having breakthrough symptoms of acid reflux despite being on omeprazole so Carafate was added and he reports he is much improved.  He is no longer clearing his throat like he was before.  No throat pain.  No difficulty swallowing.    The following portions of the patient's history were reviewed and updated as appropriate: allergies, current medications, past family history, past medical history, past social history, past surgical history and problem list.     reports that he has quit smoking. His smoking use included cigars. He has never used smokeless tobacco. He reports that he does not currently use alcohol. He reports that he does not use drugs.   Patient is not a tobacco user and has not been counseled for use of tobacco products      Review of Systems        Objective   Physical Exam  Ears: External ears no deformity, canals no discharge, tympanic membranes intact clear and mobile bilaterally.  Nares no discharge or purulence  Oral cavity no masses or lesions  Pharynx no erythema exudate or mass  Neck no adenopathy or mass         Assessment and Plan   Diagnoses and all orders for this visit:    1. Globus sensation (Primary)    2. Laryngopharyngeal reflux (LPR)    3. Gastroesophageal reflux disease, unspecified whether esophagitis present  -     omeprazole (priLOSEC) 40 MG capsule; Take 1 capsule by mouth Daily.  Dispense: 90 capsule; Refill: 1    Other orders  -     sucralfate (Carafate) 1 GM/10ML suspension; Take 10 mL by mouth 2 (Two) Times a Day.  Dispense: 600 mL; Refill: 5             Plan: With symptoms substantially improved he should just continue his current medical regimen.  I will send in refills for the next several months, and then as long as Dr. Lin is willing to write refills going forward he only  has to see me again as needed.

## 2023-04-27 ENCOUNTER — OFFICE VISIT (OUTPATIENT)
Dept: FAMILY MEDICINE CLINIC | Facility: CLINIC | Age: 68
End: 2023-04-27
Payer: MEDICARE

## 2023-04-27 VITALS
HEIGHT: 68 IN | WEIGHT: 315 LBS | HEART RATE: 81 BPM | OXYGEN SATURATION: 99 % | SYSTOLIC BLOOD PRESSURE: 174 MMHG | DIASTOLIC BLOOD PRESSURE: 90 MMHG | BODY MASS INDEX: 47.74 KG/M2

## 2023-04-27 DIAGNOSIS — I10 RESISTANT HYPERTENSION: ICD-10-CM

## 2023-04-27 DIAGNOSIS — Z79.4 TYPE 2 DIABETES MELLITUS WITH DIABETIC NEUROPATHY, WITH LONG-TERM CURRENT USE OF INSULIN: ICD-10-CM

## 2023-04-27 DIAGNOSIS — E11.40 TYPE 2 DIABETES MELLITUS WITH DIABETIC NEUROPATHY, WITH LONG-TERM CURRENT USE OF INSULIN: ICD-10-CM

## 2023-04-27 DIAGNOSIS — Z79.4 TYPE 2 DIABETES MELLITUS WITH HYPERGLYCEMIA, WITH LONG-TERM CURRENT USE OF INSULIN: Primary | ICD-10-CM

## 2023-04-27 DIAGNOSIS — E11.65 TYPE 2 DIABETES MELLITUS WITH HYPERGLYCEMIA, WITH LONG-TERM CURRENT USE OF INSULIN: Primary | ICD-10-CM

## 2023-04-27 LAB
EXPIRATION DATE: ABNORMAL
HBA1C MFR BLD: 8.8 %
Lab: ABNORMAL

## 2023-04-27 RX ORDER — GABAPENTIN 800 MG/1
800 TABLET ORAL 3 TIMES DAILY
Qty: 90 TABLET | Refills: 2 | Status: SHIPPED | OUTPATIENT
Start: 2023-04-27

## 2023-04-27 NOTE — PROGRESS NOTES
"Subjective:  Azael Bray Sr. is a 67 y.o. male who presents for     Type 2 diabetes; currently on Lantus 68 units every morning, Trulicity 4.5 mg weekly.  States that blood glucose has been improving, denies any adverse effects of medications, polyphagia, polydipsia, polyuria, hypoglycemia.  Last eye exam was approximate 10 months ago, last foot exam was approximately 6 months ago. Additionally, is on gabapentin 800 mg 3 times daily.  States that medication provides good relief, improves quality life, denies any adverse effects, fatigue, falls, confusion, dizziness, weakness.    Hypertension; currently on Carvedilol 25 mg twice daily, chlorthalidone 25 mg daily, hydralazine 50 mg 3 times daily, losartan 50 mg daily, spironolactone 100 mg daily, nifedipine 60 mg daily.  States that blood pressure has been well controlled.  Does not have any issues with medications. Denies any cardiac symptoms, chest pain, shortness of breath, headaches, vision changes, numbness, tingling, weakness, leg swelling, orthopnea, dyspnea on exertion.    There is no problem list on file for this patient.    Vitals:    Vitals:    04/27/23 0821   BP: 174/90   BP Location: Left arm   Pulse: 81   SpO2: 99%   Weight: (!) 145 kg (320 lb 4.8 oz)   Height: 172.7 cm (68\")     Body mass index is 48.7 kg/m².      Current Outpatient Medications:   •  albuterol sulfate  (90 Base) MCG/ACT inhaler, Inhale 2 puffs Every 4 (Four) Hours As Needed for Wheezing or Shortness of Air., Disp: 18 g, Rfl: 3  •  carvedilol (COREG) 25 MG tablet, Take 1 tablet by mouth 2 (Two) Times a Day With Meals., Disp: 180 tablet, Rfl: 1  •  chlorthalidone (HYGROTON) 25 MG tablet, Take 1 tablet by mouth Daily., Disp: , Rfl:   •  Dulaglutide 4.5 MG/0.5ML solution pen-injector, Inject 0.5 mL under the skin into the appropriate area as directed 1 (One) Time Per Week., Disp: 8 mL, Rfl: 2  •  empagliflozin (Jardiance) 10 MG tablet tablet, Take 1 tablet by mouth Daily., " Disp: 90 tablet, Rfl: 0  •  fluticasone (FLONASE) 50 MCG/ACT nasal spray, 2 sprays into the nostril(s) as directed by provider Daily., Disp: 18.2 mL, Rfl: 11  •  gabapentin (NEURONTIN) 800 MG tablet, Take 1 tablet by mouth 3 (Three) Times a Day., Disp: 90 tablet, Rfl: 2  •  glucose blood test strip, Use daily and as need up to TID, Disp: 100 each, Rfl: 5  •  glucose monitor monitoring kit, Use daily and as need up to TID, Disp: 1 each, Rfl: 0  •  hydrALAZINE (APRESOLINE) 50 MG tablet, Take 1 tablet by mouth 3 (Three) Times a Day., Disp: 180 tablet, Rfl: 2  •  insulin glargine (Lantus) 100 UNIT/ML injection, Inject 65 Units under the skin into the appropriate area as directed Every Morning Before Breakfast., Disp: 18 mL, Rfl: 1  •  Insulin Syringes, Disposable, U-100 0.3 ML misc, 1 each Daily., Disp: 100 each, Rfl: 1  •  Lancets (freestyle) lancets, Use daily and as need up to TID, Disp: 100 each, Rfl: 5  •  losartan (Cozaar) 50 MG tablet, Take 1 tablet by mouth Daily., Disp: 90 tablet, Rfl: 1  •  montelukast (Singulair) 10 MG tablet, Take 1 tablet by mouth Every Night., Disp: 90 tablet, Rfl: 3  •  NIFEdipine XL (PROCARDIA XL) 60 MG 24 hr tablet, Take 1 tablet by mouth Daily., Disp: 90 tablet, Rfl: 1  •  omeprazole (priLOSEC) 40 MG capsule, Take 1 capsule by mouth Daily., Disp: 90 capsule, Rfl: 1  •  potassium chloride ER (K-TAB) 20 MEQ tablet controlled-release ER tablet, Take 1 tablet by mouth 2 (Two) Times a Day., Disp: 120 tablet, Rfl: 1  •  rosuvastatin (CRESTOR) 40 MG tablet, Take 0.5 tablets by mouth 3 (Three) Times a Week., Disp: 90 tablet, Rfl: 1  •  sildenafil (Viagra) 100 MG tablet, Take 1 tablet by mouth Daily As Needed for Erectile Dysfunction., Disp: 15 tablet, Rfl: 5  •  spironolactone (ALDACTONE) 100 MG tablet, Take 1 tablet by mouth Daily., Disp: 90 tablet, Rfl: 1  •  sucralfate (Carafate) 1 GM/10ML suspension, Take 10 mL by mouth 2 (Two) Times a Day., Disp: 600 mL, Rfl: 5  •  traZODone (DESYREL) 50  MG tablet, Take 1 tablet by mouth Every Night., Disp: 30 tablet, Rfl: 5  •  vitamin B-12 (CYANOCOBALAMIN) 500 MCG tablet, Take 1 tablet by mouth Daily., Disp: 90 tablet, Rfl: 1  •  vitamin D (ERGOCALCIFEROL) 1.25 MG (11705 UT) capsule capsule, Take 1 capsule by mouth 1 (One) Time Per Week., Disp: 5 capsule, Rfl: 1    There is no problem list on file for this patient.    Past Surgical History:   Procedure Laterality Date   • APPENDECTOMY     • [facility]  ?    [facility]   • HERNIA REPAIR Left      Social History     Socioeconomic History   • Marital status:    Tobacco Use   • Smoking status: Former     Types: Cigars   • Smokeless tobacco: Never   • Tobacco comments:     [employer] hardship duty  overseas [place]   Vaping Use   • Vaping Use: Never used   Substance and Sexual Activity   • Alcohol use: Not Currently     Comment: none   • Drug use: Never   • Sexual activity: Not Currently     Family History   Problem Relation Age of Onset   • Diabetes Father            • Hypertension Father    • Heart disease Father    • Diabetes Paternal Grandfather              Office Visit on 2023   Component Date Value Ref Range Status   • Hemoglobin A1C 2023 9.0  % Final   • Lot Number [id number]   Final   • Expiration Date 2023   Final      Polysomnography 4 or More Parameters  [facility]  [address] [place] [address]  Phone: [phone]  Fax: [phone]    Split Night Polysomnography Interpretation    Patient's Name: Azael Bray Sr.   YOB: 1955  Date of Study: 24 Aug 2022  Referring provider: LORENZO Luis  Primary Care Physician: Rafat Lin MD    History Of Present Illness:  Mr. Azael Bray Sr. is a 66 y.o. male with a Body mass index is   47.14 kg/m².  Concurrent medical history includes Obesity w/ elevated   bicarb of 31, DM, HTN, PTSD, CHF/HFpEF, obstructive sleep apnea.   Prior   Echo in 2019 showing LVEF 6%; moderately dilated RV w/ nml fx.  Concurrent   medications include Coreg, Neurontin, Losartan, Insulin, Statin,   Aldactone.  Patient presents for a polysomnogram for complaints of JOSEPH,   excessive daytime sleepiness.     Polysomnography:  The patient's sleep [facility]  Sleep was monitored in accordance with   recommended AASM guidelines.  The recording also included oral/nasal   airflow, chest and abdominal respiratory effort, nasal pressure, single   channel EKG, intercostal EMG, bilateral tibialis EMG, and oxygen   saturation (by pulse oximeter).    Technical Comments:  Technically adequate study.    DIAGNOSTIC/BASELINE PORTION:    EEG & Sleep Architecture:  --Sleep onset occurred after 36 minutes, unremarkable.   --Sleep efficiency was 35%, significantly reduced, normal is >80% in age   65 and less.    --Total sleep time was 72 minutes.    --Wake after sleep onset was 101 minutes.   --Sleep architecture:   -->  NREM noted.    -->  No REM observed.    --There was no evidence of parasomnias (night terrors, sleepwalking, etc.)   on study night.   --No noted EEG abnormalities.     Respiratory:  --Snoring was noted during the study (for 05% of total sleep time).  --Supine sleep was not observed.  --The overall apnea-hypopnea index (AHI) was 56 / hr which includes only   apneas and hypopneas with 4% desaturations noted.    --OAHI 56 / hr.    --EAGLE 00 / hr.    --REM AHI: n/a.    --Supine AHI: n/a.  --Mean oxygen saturation asleep was 88%.  The oxygen linda was 81%.    --The patient spent 44 minutes, or 61% of total sleep time with   saturations less than 89%.   --Sustained hypoxia was observed during the study for > 5 minutes.    Arousals:   --The overall arousal index was 32 / hr.     Cardiac/EKG:   --Normal sinus rhythm with ectopy: relatively frequent narrow complex   beats (consistent with PACs) and more occasional wide  complex beats   (consistent with PACs as well as couplets and bigeminy) observed.    --Mean heart rate of 74 bpm.  Range of 46 bpm to 84 bpm.      EMG / Periodic Limb Movements:    --The periodic limb movement index was 00 / hr with 00 / hr leading to   arousal.    --Expected index is < 15 / hr.         THERAPEUTIC/TREATMENT PORTION:    EEG & Sleep Architecture:  -No noted EEG abnormalities.   -There was no evidence of parasomnias (night terrors, sleepwalking, etc.)   on study night.   -Sleep onset occurred after 35 minutes, unremarkable.   -Sleep efficiency was 31%, effectively unchanged.    -Total sleep time was 108 minutes.    -Wake after sleep onset was 206 minutes.   --Sleep architecture:   -->  NREM and REM sleep were noted.     -->  One cycle of sleep were observed.      Respiratory:  -Supine sleep was observed for 04% of the study.      -CPAP was titrated from a pressure of 10cm to 15cm:    • At 10 cm: NREM lateral with poor sleep  • At 12-14 cm: NREM lateral with ongoing events, sustained hypoxia; REM   lateral with ongoing events.  2 LPM added at 13 cm to good effect and   resolution of sustained hypoxia.  • At 15 cm: NERM lateral, brief, with improved control    -No REM sleep supine.     -Interfaces Used: Not specified; expected leak controlled and stable in   40s - 50s LPM.    Cardiac/EKG:   --Normal sinus rhythm with ectopy: relatively frequent narrow complex   beats (consistent with PACs) and more occasional wide complex beats   (consistent with PACs as well as couplets and a triplet) observed of   unclear clinical significance.   --Mean heart rate of 64 bpm.  Range of 45 bpm to 76 bpm.      EMG / Periodic Limb Movements:    --The periodic limb movement index was 00 / hr with 00 / hr leading to   arousal.    --Expected index is < 15 / hr.         Study Conditions:    --Head of the bed: flat   --Supplemental oxygen: 2 LPM at epoch 1009   --Medications Used on the Night of the Study: none  "specified    Subjective:   --The post sleep study questionnaire indicated the following:   --> \"How refreshing was your sleep last night?\":  \"Not at all\"   --> \"How satisfactory was your sleep last night?\":  \"Not at all\"    Assessment & Recommendations:  Mr. Azael Bray Sr. is a 66 y.o.   male who underwent split night polysomnography.      The diagnostic portion revealed:   1. Severe Obstructive Sleep Apnea  / Sleep Disordered Breathing, with an   AHI of 56 / hr.  2. Limited total sleep time    3. Sustained nocturnal hypoxia in context of obesity w/ elevated bicarb of   31.    During the therapeutic/treatment portion, undergoing positive pressure   titration, a wide range of pressures were tested.     An adequate titration occurred per AASM guidelines, limited by lack of REM   supine and complicated by ongoing sustained nocturnal hypoxia in context   of obesity w/ elevated bicarb of 31 needing supplemental O2.  No ideal   pressure identified for REM lateral.  NREM lateral with fair control at 15   cm but limited time.      Cardiac findings as noted above.  Recommend continued follow-up with Dr. Lin.     Moving forward, recommend in lab BiPAP titration given obesity related   hypoventilation concerns and sub optimal control on CPAP at 15 cm with 2   LPM O2.  Consideration for sleep aid on study night.  Recommend clinic   follow-up (as currently scheduled) to discuss results and treatment plan.    Call with any questions or concerns.     Ziggy Richard II, MD  Sleep Medicine  09/14/22 @ 07:43 CDT    CC: Rafat Lin MD Caron, Sydney, APRN      @[email or web address]@  Immunization History   Administered Date(s) Administered   • COVID-19 (MODERNA) 1st,2nd,3rd Dose Monovalent 02/24/2021, 03/24/2021, 11/02/2021, 04/07/2022   • COVID-19 (PFIZER) BIVALENT 12+YRS 09/22/2022   • Fluzone High-Dose 65+yrs 09/30/2021, 09/21/2022   • Shingrix 12/07/2019, 06/07/2020   • Tdap 12/07/2019     The following portions " of the patient's history were reviewed and updated as appropriate: allergies, current medications, past family history, past medical history, past social history, past surgical history and problem list.    PHQ-9 Total Score:         Lab 04/27/23  0823   HEMOGLOBIN A1C 8.8       Physical Exam  Constitutional:       General: He is not in acute distress.     Appearance: He is obese.   HENT:      Head: Normocephalic and atraumatic.   Cardiovascular:      Rate and Rhythm: Normal rate and regular rhythm.      Heart sounds: Normal heart sounds. No murmur heard.  Pulmonary:      Effort: Pulmonary effort is normal.      Breath sounds: Normal breath sounds.   Abdominal:      Palpations: Abdomen is soft.      Tenderness: There is no abdominal tenderness.   Musculoskeletal:         General: No swelling.      Right lower leg: No edema.      Left lower leg: No edema.   Skin:     Coloration: Skin is not jaundiced.      Findings: No rash.   Neurological:      Mental Status: He is alert and oriented to person, place, and time. Mental status is at baseline.   Psychiatric:         Mood and Affect: Mood normal.         Behavior: Behavior normal.       Assessment & Plan    Diagnosis Plan   1. Type 2 diabetes mellitus with hyperglycemia, with long-term current use of insulin  POCT glycated hemoglobin, total    empagliflozin (Jardiance) 10 MG tablet tablet    Dulaglutide 4.5 MG/0.5ML solution pen-injector      2. Resistant hypertension        3. Type 2 diabetes mellitus with diabetic neuropathy, with long-term current use of insulin  gabapentin (NEURONTIN) 800 MG tablet         Orders Placed This Encounter   Procedures   • POCT glycated hemoglobin, total     Order Specific Question:   Release to patient     Answer:   Routine Release     Hypertension; does not have issues with current medications, blood pressure has been well controlled but he did not take medication this morning.  Discussed importance of blood pressure control, risk of  uncontrolled hypertension, continue to work on diet, exercise, weight loss.  After discussion, will encourage adherence to medication and CPAP machine.  Follow-up in 3 months with blood pressure log or sooner if needed.  Given strict ER/cardiac precautions.    Type 2 diabetes; denied issues with current medications, A1c today 8.8%, slightly improved from previous but uncontrolled.  Continue to encourage diet, exercise, weight loss, up-to-date on eye and foot exams. On ACE/ARB.  After discussion with patient, will add on jardiance as above. Follow-up in 3 months or sooner if needed.  Additionally, is on gabapentin for neuropathy, Chris reviewed and appropriate, no signs of abuse, misuse, will refill.          This document has been electronically signed by Rafat Lin MD on April 27, 2023 09:00 CDT    EMR Dragon/Transciption Disclaimer: Some of this note may be an electronic transcription/translation of spoken language to printed text.  The electronic translation of spoken language may permit erroneous, or at times, nonsensical words or phrases to be inadvertently transcribed. Although I have reviewed the note for such errors, some may still exist.

## 2023-05-16 DIAGNOSIS — Z79.4 TYPE 2 DIABETES MELLITUS WITH HYPERGLYCEMIA, WITH LONG-TERM CURRENT USE OF INSULIN: ICD-10-CM

## 2023-05-16 DIAGNOSIS — E11.65 TYPE 2 DIABETES MELLITUS WITH HYPERGLYCEMIA, WITH LONG-TERM CURRENT USE OF INSULIN: ICD-10-CM

## 2023-05-30 ENCOUNTER — TELEPHONE (OUTPATIENT)
Dept: FAMILY MEDICINE CLINIC | Facility: CLINIC | Age: 68
End: 2023-05-30

## 2023-05-30 DIAGNOSIS — Z79.4 TYPE 2 DIABETES MELLITUS WITH HYPERGLYCEMIA, WITH LONG-TERM CURRENT USE OF INSULIN: Primary | ICD-10-CM

## 2023-05-30 DIAGNOSIS — E11.65 TYPE 2 DIABETES MELLITUS WITH HYPERGLYCEMIA, WITH LONG-TERM CURRENT USE OF INSULIN: Primary | ICD-10-CM

## 2023-05-30 NOTE — TELEPHONE ENCOUNTER
Patient called and stated he needs a PA  for his Dulaglutide. He stated he is out of the medication. Please call back [phone].

## 2023-05-31 ENCOUNTER — TELEPHONE (OUTPATIENT)
Dept: FAMILY MEDICINE CLINIC | Facility: CLINIC | Age: 68
End: 2023-05-31

## 2023-05-31 NOTE — TELEPHONE ENCOUNTER
Called [facility].  They stated that they do not need a P/A on Trulicity.  They do not cover it anymore due to a new black label warning of retinopathy.  They will cover Ozempic now. Pt was on Trulicity 4.5 mg/0.5 ml.  Pt stated that he has been out of med.  Do you want to change him over to Ozempic?

## 2023-06-01 ENCOUNTER — TELEPHONE (OUTPATIENT)
Dept: FAMILY MEDICINE CLINIC | Facility: CLINIC | Age: 68
End: 2023-06-01

## 2023-06-01 NOTE — TELEPHONE ENCOUNTER
Patient called and stated this medication is no longer approved by the [facility].VA advised him that ozempic would be what they would approve.  He would like to be called back [phone]

## 2023-06-02 RX ORDER — SEMAGLUTIDE 1.34 MG/ML
1 INJECTION, SOLUTION SUBCUTANEOUS WEEKLY
Qty: 9 ML | Refills: 1 | Status: SHIPPED | OUTPATIENT
Start: 2023-06-02

## 2023-06-02 NOTE — TELEPHONE ENCOUNTER
Spoke with pt and let him know that theTrulicuty has been replaced with Ozempic and that Rx has been sent. He stated that he has not had a shot this week and it will take approx 7-10 days to received the Ozempic.  We do not have samples of this at this time. He will monitor his blood sugars and call back if they get too high. He will continue his Lantus and meds.

## 2023-07-24 ENCOUNTER — TELEPHONE (OUTPATIENT)
Dept: FAMILY MEDICINE CLINIC | Facility: CLINIC | Age: 68
End: 2023-07-24
Payer: OTHER GOVERNMENT

## 2023-07-24 DIAGNOSIS — Z79.4 TYPE 2 DIABETES MELLITUS WITH HYPERGLYCEMIA, WITH LONG-TERM CURRENT USE OF INSULIN: ICD-10-CM

## 2023-07-24 DIAGNOSIS — E11.65 TYPE 2 DIABETES MELLITUS WITH HYPERGLYCEMIA, WITH LONG-TERM CURRENT USE OF INSULIN: ICD-10-CM

## 2023-07-24 RX ORDER — SEMAGLUTIDE 1.34 MG/ML
1 INJECTION, SOLUTION SUBCUTANEOUS WEEKLY
Qty: 9 ML | Refills: 1 | Status: SHIPPED | OUTPATIENT
Start: 2023-07-24 | End: 2023-07-31

## 2023-07-24 NOTE — TELEPHONE ENCOUNTER
Rx Refill Note  Requested Prescriptions     Pending Prescriptions Disp Refills    Semaglutide, 1 MG/DOSE, (Ozempic, 1 MG/DOSE,) 4 MG/3ML solution pen-injector 9 mL 1     Sig: Inject 1 mg under the skin into the appropriate area as directed 1 (One) Time Per Week.      Last office visit with prescribing clinician: 4/27/2023   Last telemedicine visit with prescribing clinician: Visit date not found   Next office visit with prescribing clinician: 7/31/2023                         Would you like a call back once the refill request has been completed: [] Yes [] No    If the office needs to give you a call back, can they leave a voicemail: [] Yes [] No    Shantal Lutz MA  07/24/23, 14:33 CDT

## 2023-07-24 NOTE — TELEPHONE ENCOUNTER
Patient would like a call back about getting his [facility] referral renewed to be seen by .    He would like a call back at [phone]

## 2023-07-31 ENCOUNTER — OFFICE VISIT (OUTPATIENT)
Dept: FAMILY MEDICINE CLINIC | Facility: CLINIC | Age: 68
End: 2023-07-31
Payer: MEDICARE

## 2023-07-31 VITALS
SYSTOLIC BLOOD PRESSURE: 140 MMHG | DIASTOLIC BLOOD PRESSURE: 68 MMHG | TEMPERATURE: 97.9 F | HEART RATE: 79 BPM | OXYGEN SATURATION: 94 % | WEIGHT: 315 LBS | HEIGHT: 68 IN | BODY MASS INDEX: 47.74 KG/M2

## 2023-07-31 DIAGNOSIS — E11.65 TYPE 2 DIABETES MELLITUS WITH HYPERGLYCEMIA, WITH LONG-TERM CURRENT USE OF INSULIN: Primary | ICD-10-CM

## 2023-07-31 DIAGNOSIS — E78.5 HYPERLIPIDEMIA, UNSPECIFIED HYPERLIPIDEMIA TYPE: ICD-10-CM

## 2023-07-31 DIAGNOSIS — Z79.4 TYPE 2 DIABETES MELLITUS WITH HYPERGLYCEMIA, WITH LONG-TERM CURRENT USE OF INSULIN: Primary | ICD-10-CM

## 2023-07-31 DIAGNOSIS — E11.40 TYPE 2 DIABETES MELLITUS WITH DIABETIC NEUROPATHY, WITH LONG-TERM CURRENT USE OF INSULIN: ICD-10-CM

## 2023-07-31 DIAGNOSIS — J45.20 MILD INTERMITTENT ASTHMA WITHOUT COMPLICATION: ICD-10-CM

## 2023-07-31 DIAGNOSIS — K21.9 GASTROESOPHAGEAL REFLUX DISEASE, UNSPECIFIED WHETHER ESOPHAGITIS PRESENT: ICD-10-CM

## 2023-07-31 DIAGNOSIS — G47.00 INSOMNIA, UNSPECIFIED TYPE: ICD-10-CM

## 2023-07-31 DIAGNOSIS — E55.9 VITAMIN D DEFICIENCY: ICD-10-CM

## 2023-07-31 DIAGNOSIS — I10 RESISTANT HYPERTENSION: ICD-10-CM

## 2023-07-31 DIAGNOSIS — Z79.4 TYPE 2 DIABETES MELLITUS WITH DIABETIC NEUROPATHY, WITH LONG-TERM CURRENT USE OF INSULIN: ICD-10-CM

## 2023-07-31 DIAGNOSIS — E87.6 HYPOKALEMIA: ICD-10-CM

## 2023-07-31 LAB
EXPIRATION DATE: ABNORMAL
HBA1C MFR BLD: 7.5 %
Lab: ABNORMAL

## 2023-07-31 PROCEDURE — 83036 HEMOGLOBIN GLYCOSYLATED A1C: CPT | Performed by: STUDENT IN AN ORGANIZED HEALTH CARE EDUCATION/TRAINING PROGRAM

## 2023-07-31 PROCEDURE — 99214 OFFICE O/P EST MOD 30 MIN: CPT | Performed by: STUDENT IN AN ORGANIZED HEALTH CARE EDUCATION/TRAINING PROGRAM

## 2023-07-31 PROCEDURE — 3051F HG A1C>EQUAL 7.0%<8.0%: CPT | Performed by: STUDENT IN AN ORGANIZED HEALTH CARE EDUCATION/TRAINING PROGRAM

## 2023-07-31 RX ORDER — NIFEDIPINE 60 MG/1
60 TABLET, EXTENDED RELEASE ORAL DAILY
Qty: 90 TABLET | Refills: 1 | Status: SHIPPED | OUTPATIENT
Start: 2023-07-31

## 2023-07-31 RX ORDER — ROSUVASTATIN CALCIUM 40 MG/1
20 TABLET, COATED ORAL 3 TIMES WEEKLY
Qty: 90 TABLET | Refills: 0 | Status: SHIPPED | OUTPATIENT
Start: 2023-07-31

## 2023-07-31 RX ORDER — CHLORTHALIDONE 25 MG/1
25 TABLET ORAL DAILY
Qty: 90 TABLET | Refills: 1 | Status: SHIPPED | OUTPATIENT
Start: 2023-07-31

## 2023-07-31 RX ORDER — TRAZODONE HYDROCHLORIDE 50 MG/1
50 TABLET ORAL NIGHTLY
Qty: 30 TABLET | Refills: 5 | Status: SHIPPED | OUTPATIENT
Start: 2023-07-31

## 2023-07-31 RX ORDER — POTASSIUM CHLORIDE 1500 MG/1
20 TABLET, EXTENDED RELEASE ORAL 2 TIMES DAILY
Qty: 120 TABLET | Refills: 1 | Status: SHIPPED | OUTPATIENT
Start: 2023-07-31

## 2023-07-31 RX ORDER — INSULIN GLARGINE 100 [IU]/ML
55 INJECTION, SOLUTION SUBCUTANEOUS
Qty: 18 ML | Refills: 1 | Status: SHIPPED | OUTPATIENT
Start: 2023-07-31

## 2023-07-31 RX ORDER — MONTELUKAST SODIUM 10 MG/1
10 TABLET ORAL NIGHTLY
Qty: 90 TABLET | Refills: 3 | Status: SHIPPED | OUTPATIENT
Start: 2023-07-31

## 2023-07-31 RX ORDER — CARVEDILOL 25 MG/1
25 TABLET ORAL 2 TIMES DAILY WITH MEALS
Qty: 180 TABLET | Refills: 1 | Status: SHIPPED | OUTPATIENT
Start: 2023-07-31

## 2023-07-31 RX ORDER — ERGOCALCIFEROL 1.25 MG/1
50000 CAPSULE ORAL WEEKLY
Qty: 5 CAPSULE | Refills: 1 | Status: SHIPPED | OUTPATIENT
Start: 2023-07-31

## 2023-07-31 RX ORDER — SUCRALFATE ORAL 1 G/10ML
1 SUSPENSION ORAL 2 TIMES DAILY
Qty: 600 ML | Refills: 5 | Status: SHIPPED | OUTPATIENT
Start: 2023-07-31

## 2023-07-31 RX ORDER — OMEPRAZOLE 40 MG/1
40 CAPSULE, DELAYED RELEASE ORAL DAILY
Qty: 90 CAPSULE | Refills: 1 | Status: SHIPPED | OUTPATIENT
Start: 2023-07-31

## 2023-07-31 RX ORDER — HYDRALAZINE HYDROCHLORIDE 50 MG/1
50 TABLET, FILM COATED ORAL 3 TIMES DAILY
Qty: 180 TABLET | Refills: 2 | Status: SHIPPED | OUTPATIENT
Start: 2023-07-31

## 2023-07-31 RX ORDER — SPIRONOLACTONE 100 MG/1
100 TABLET, FILM COATED ORAL DAILY
Qty: 90 TABLET | Refills: 1 | Status: SHIPPED | OUTPATIENT
Start: 2023-07-31

## 2023-07-31 RX ORDER — GABAPENTIN 800 MG/1
800 TABLET ORAL 3 TIMES DAILY
Qty: 90 TABLET | Refills: 2 | Status: SHIPPED | OUTPATIENT
Start: 2023-07-31